# Patient Record
Sex: MALE | Race: WHITE | NOT HISPANIC OR LATINO | Employment: FULL TIME | URBAN - METROPOLITAN AREA
[De-identification: names, ages, dates, MRNs, and addresses within clinical notes are randomized per-mention and may not be internally consistent; named-entity substitution may affect disease eponyms.]

---

## 2018-06-26 ENCOUNTER — OFFICE VISIT (OUTPATIENT)
Dept: FAMILY MEDICINE | Facility: CLINIC | Age: 48
End: 2018-06-26
Payer: COMMERCIAL

## 2018-06-26 VITALS
DIASTOLIC BLOOD PRESSURE: 92 MMHG | HEIGHT: 71 IN | HEART RATE: 83 BPM | TEMPERATURE: 99 F | OXYGEN SATURATION: 96 % | BODY MASS INDEX: 29.74 KG/M2 | SYSTOLIC BLOOD PRESSURE: 134 MMHG | WEIGHT: 212.44 LBS

## 2018-06-26 DIAGNOSIS — E78.5 HYPERLIPIDEMIA, UNSPECIFIED HYPERLIPIDEMIA TYPE: ICD-10-CM

## 2018-06-26 DIAGNOSIS — G56.03 BILATERAL CARPAL TUNNEL SYNDROME: ICD-10-CM

## 2018-06-26 DIAGNOSIS — F51.01 PRIMARY INSOMNIA: ICD-10-CM

## 2018-06-26 DIAGNOSIS — R03.0 ELEVATED BP WITHOUT DIAGNOSIS OF HYPERTENSION: Primary | ICD-10-CM

## 2018-06-26 PROBLEM — D75.839 THROMBOCYTOSIS: Status: ACTIVE | Noted: 2018-01-10

## 2018-06-26 PROCEDURE — 3008F BODY MASS INDEX DOCD: CPT | Mod: CPTII,S$GLB,, | Performed by: FAMILY MEDICINE

## 2018-06-26 PROCEDURE — 99204 OFFICE O/P NEW MOD 45 MIN: CPT | Mod: S$GLB,,, | Performed by: FAMILY MEDICINE

## 2018-06-26 PROCEDURE — 99999 PR PBB SHADOW E&M-NEW PATIENT-LVL III: CPT | Mod: PBBFAC,,, | Performed by: FAMILY MEDICINE

## 2018-06-26 RX ORDER — TADALAFIL 5 MG/1
5 TABLET ORAL
COMMUNITY
End: 2018-10-17 | Stop reason: ALTCHOICE

## 2018-06-26 RX ORDER — LISINOPRIL 10 MG/1
10 TABLET ORAL DAILY
Qty: 30 TABLET | Refills: 1 | Status: SHIPPED | OUTPATIENT
Start: 2018-06-26 | End: 2018-07-27 | Stop reason: SINTOL

## 2018-06-26 RX ORDER — MULTIVITAMIN
TABLET ORAL
COMMUNITY
Start: 2018-01-09 | End: 2019-01-09

## 2018-06-26 RX ORDER — ASPIRIN 81 MG/1
81 TABLET ORAL
COMMUNITY
Start: 2018-01-09

## 2018-06-26 RX ORDER — ZOLPIDEM TARTRATE 5 MG/1
5 TABLET ORAL NIGHTLY PRN
Qty: 30 TABLET | Refills: 0 | Status: SHIPPED | OUTPATIENT
Start: 2018-06-26 | End: 2018-07-27

## 2018-06-26 NOTE — PROGRESS NOTES
Subjective:       Patient ID: Nehemias Zhang is a 47 y.o. male.    Chief Complaint: Hypertension; Insomnia; and Tingling (both hands)      HPI     Tingling    Additional comments: both hands       Last edited by Bharati Gibson MA on 6/26/2018  4:10 PM. (History)     Here to establish care and complaints of      Elevated blood pressure without diagnosis of hypertension presents for 7-8 months.  Has been managing with diet and exercise.  Worsening the past 2 months blood pressure is on the average is 150s to 160s/80s. Upon questioning, he advocates snoring but said he does not know if he has sleep apnea. He went to sleep clinic for his insomnia and sleep study done 10 years ago-cannot remember his diagnosis.  He also has HLD for which he takes Omega 3 and aspirin for primary prevention of stroke and MI.     Latest labs were done 01/18 which I reviewed with him on my chart and he will email to the clinic for our record. His AIC was 5.4, choles was 232 and TSH was normal too including PSA.    Insomnia: Chronic. Present for years.  He was on Ambien a couple months ago when he ran out on and EndoStim PCP left town. He does not take it daily and a 30 days supply would last him 6 mons.     C/O tingling to both hands. Intermittent. Present for a couple of months, lasts minutes and resolves.   Past Medical History:   Diagnosis Date    Hypertension     Insomnia      Family History   Problem Relation Age of Onset    Heart disease Mother     Prostate cancer Brother     Colon cancer Maternal Grandfather     Colon cancer Paternal Grandfather      Social History     Social History    Marital status: Single     Spouse name: N/A    Number of children: N/A    Years of education: N/A     Occupational History    Not on file.     Social History Main Topics    Smoking status: Former Smoker     Quit date: 3/26/2018    Smokeless tobacco: Not on file    Alcohol use No    Drug use: No    Sexual activity: Yes     Partners: Female  "    Other Topics Concern    Not on file     Social History Narrative    No narrative on file       Review of Systems   Constitutional: Negative for appetite change and fever.   HENT: Negative for congestion, facial swelling and voice change.    Eyes: Negative for discharge and itching.   Respiratory: Negative for cough, chest tightness and wheezing.    Cardiovascular: Negative.  Negative for chest pain and leg swelling.   Gastrointestinal: Negative for abdominal pain, nausea and vomiting.   Endocrine: Negative for cold intolerance and heat intolerance.   Genitourinary: Negative for dysuria and flank pain.   Musculoskeletal: Positive for back pain. Negative for myalgias and neck stiffness.        Low back pain-DJ for which he does eversion therapy   Skin: Negative for pallor and rash.   Neurological: Negative for facial asymmetry and weakness.   Psychiatric/Behavioral: Negative for agitation and confusion.       Objective:      BP (!) 134/92 (BP Location: Right arm, Patient Position: Sitting, BP Method: Medium (Manual))   Pulse 83   Temp 98.7 °F (37.1 °C) (Oral)   Ht 5' 11" (1.803 m)   Wt 96.3 kg (212 lb 6.6 oz)   SpO2 96%   BMI 29.63 kg/m²   No results found for this or any previous visit.  Physical Exam   Constitutional: He is oriented to person, place, and time. He appears well-developed and well-nourished.   HENT:   Head: Normocephalic and atraumatic.   Right Ear: External ear normal.   Left Ear: External ear normal.   Eyes: Conjunctivae are normal. Right eye exhibits no discharge. Left eye exhibits no discharge.   Neck: Normal range of motion. Neck supple.   Cardiovascular: Normal rate and regular rhythm.    Pulmonary/Chest: Effort normal and breath sounds normal. No respiratory distress. He has no wheezes. He exhibits no tenderness.   Abdominal: Soft. Normal appearance and bowel sounds are normal. There is no hepatosplenomegaly.   Genitourinary:   Genitourinary Comments: deferred   Musculoskeletal: " Normal range of motion.   Tinel test is positive while Phalen is negative   Lymphadenopathy:     He has no cervical adenopathy.   Neurological: He is alert and oriented to person, place, and time.   Skin: Skin is warm and dry.   Psychiatric: He has a normal mood and affect. His behavior is normal.   Nursing note and vitals reviewed.      Assessment:       1. Elevated BP without diagnosis of hypertension    2. Hyperlipidemia, unspecified hyperlipidemia type    3. Bilateral carpal tunnel syndrome    4. Primary insomnia        Plan:   New patient and we will need to obtain record from previous pcp    Elevated BP without diagnosis of hypertension:  The patient is asked to make an attempt to improve diet and exercise patterns to aid in medical management of this problem. DASH diet. Regular exercise and monitor BP at home.  - lisinopril 10 MG tablet; Take 1 tablet (10 mg total) by mouth once daily.  Dispense: 30 tablet; Refill: 1    Hyperlipidemia, unspecified hyperlipidemia type  Continue diet, exercise,omega 3  And omega    Bilateral carpal tunnel syndrome  -monitor for now    Primary insomnia  -     zolpidem (AMBIEN) 5 MG Tab; Take 1 tablet (5 mg total) by mouth nightly as needed.  Dispense: 30 tablet; Refill: 0

## 2018-06-26 NOTE — PATIENT INSTRUCTIONS
Taking ACE Inhibitors     The name of my ACE inhibitor is:        ______________________________      Your healthcare provider has prescribed an ACE (angiotensin-converting enzyme) inhibitor. This medicine opens up your blood vessels and decreases resistance. This allows your blood to flow more easily and makes your heart's work easier. This sheet gives you tips for taking your ACE inhibitor.     Take your medication at the same time each day.     Why might I need an ACE inhibitor?  · It gives you more energy to do the things you enjoy.  · It helps you stay out of the hospital.  · It helps you live longer.  · It strengthens your heart and kidneys.  Tips to help you  · Follow the fact sheet that comes with your medicine. It tells you when and how to take it. Ask for a sheet if you dont get one.  · Have a routine for taking your medicine. Take it at the same time each day. A watch with an alarm can help.  · Take your medicine at least 1 hour before you eat, if you are taking captopril or moexipril, as described in the 's instructions, or 2 hours after you eat. You may take all other ACE inhibitors at any time, according to your healthcare provider's instructions.   · Do not change the dose or stop taking your medicine, unless your provider tells you to. It may take a few weeks for you to feel that the medicine is working.     When should I call my healthcare provider?  · You have diarrhea, nausea, vomiting, or you are sweating. These can cause loss of water (dehydration) and low blood pressure.  · You have a dry, hacking cough or a sore throat.  · You feel dizzy or faint, or have a headache.  · You have a fever or chills, trouble breathing or swallowing, or swelling in your face, mouth, lips, arms, lower legs, ankles, or feet. These may be signs of an allergic reaction.  · You have any other unusual symptoms.  · Tell your healthcare provider if you wish to become pregnant or think you may be pregnant.  ACE inhibitors can cause serious side effects to your unborn child. Your healthcare provider can prescribe medicine to replace your ACE inhibitor that may be safer to take while you are pregnant or trying to become pregnant.   Date Last Reviewed: 6/1/2016  © 2183-3560 [employer]. [address] [place] [address]. All rights reserved. This information is not intended as a substitute for professional medical care. Always follow your healthcare professional's instructions.

## 2018-07-27 ENCOUNTER — OFFICE VISIT (OUTPATIENT)
Dept: FAMILY MEDICINE | Facility: CLINIC | Age: 48
End: 2018-07-27
Payer: COMMERCIAL

## 2018-07-27 VITALS
DIASTOLIC BLOOD PRESSURE: 76 MMHG | OXYGEN SATURATION: 95 % | HEART RATE: 79 BPM | SYSTOLIC BLOOD PRESSURE: 130 MMHG | HEIGHT: 70 IN | WEIGHT: 217.38 LBS | BODY MASS INDEX: 31.12 KG/M2 | TEMPERATURE: 99 F

## 2018-07-27 DIAGNOSIS — G47.33 OBSTRUCTIVE SLEEP APNEA (ADULT) (PEDIATRIC): ICD-10-CM

## 2018-07-27 DIAGNOSIS — E78.00 HYPERCHOLESTEROLEMIA: ICD-10-CM

## 2018-07-27 DIAGNOSIS — I10 HTN, GOAL BELOW 130/80: Primary | ICD-10-CM

## 2018-07-27 DIAGNOSIS — G47.00 INSOMNIA, UNSPECIFIED TYPE: ICD-10-CM

## 2018-07-27 DIAGNOSIS — E66.9 OBESITY (BMI 30-39.9): ICD-10-CM

## 2018-07-27 PROCEDURE — 99999 PR PBB SHADOW E&M-EST. PATIENT-LVL III: CPT | Mod: PBBFAC,,, | Performed by: FAMILY MEDICINE

## 2018-07-27 PROCEDURE — 3008F BODY MASS INDEX DOCD: CPT | Mod: CPTII,S$GLB,, | Performed by: FAMILY MEDICINE

## 2018-07-27 PROCEDURE — 99214 OFFICE O/P EST MOD 30 MIN: CPT | Mod: S$GLB,,, | Performed by: FAMILY MEDICINE

## 2018-07-27 RX ORDER — ZALEPLON 10 MG/1
10 CAPSULE ORAL NIGHTLY
Qty: 14 CAPSULE | Refills: 0 | Status: SHIPPED | OUTPATIENT
Start: 2018-07-27 | End: 2018-08-10

## 2018-07-27 RX ORDER — HYDROCHLOROTHIAZIDE 25 MG/1
25 TABLET ORAL DAILY
Qty: 30 TABLET | Refills: 11 | Status: SHIPPED | OUTPATIENT
Start: 2018-07-27 | End: 2018-10-17 | Stop reason: CLARIF

## 2018-07-27 NOTE — PROGRESS NOTES
Subjective:       Patient ID: Nehemias Zhang is a 48 y.o. male.    Chief Complaint: Follow-up  HTN: controlled. was started on lisinopril one month ago but stopped taking it a week ago due to dry hacking cough. Cough is almost completely resolved now. Will need another med. Denies history of gout.      Sleep Apnea: Patient presents with possible obstructive sleep apnea. Patient generally gets less than 4 hours of sleep per night, and states he generally have nightime awakenings. Snoring of moderate severity is present. Apneic episodes are present likely. Nasal obstruction are present. BMI is 31.19.    Insomnia: He has also struggled with insomnia most of his life. Diff fallig asleep and diff staying asleep. Melatonin, ambien has failed. He is not a candidate for trazodone due to history of erectile dysfunction.    EPWORTH SLEEPINESS SCALE    Sitting and reading ____  3  Watching TV ____  3  Sitting inactive in a public place ____  2  Being a passenger in a motor vehicle for an hour or more ____  0  Lying down in the afternoon  ____  2  Sitting and talking to someone  ____  2  Sitting quietly after lunch (no alcohol) ____  2  Stopped for a few minutes in traffic while driving  ____  1    Total score   ____  15    Interpretation:  0-7: Normal  sleepiness  8-9: Average amount of daytime sleepiness.  10-15:Excessively sleepiness depending on the situation. Medical attention suggested.  16-24: Excessive sleepiness and medical attention recommended.    Reference: Feliciano MCGRATH. A new method for measuring daytime sleepiness: The Fate  Sleepiness Scale. Sleep 1991; 14(6):540-5.  Past Medical History:   Diagnosis Date    Hypertension     Insomnia      Family History   Problem Relation Age of Onset    Heart disease Mother     Prostate cancer Brother     Colon cancer Maternal Grandfather     Colon cancer Paternal Grandfather      Social History     Social History    Marital status: Single     Spouse name: N/A     "Number of children: N/A    Years of education: N/A     Occupational History    Not on file.     Social History Main Topics    Smoking status: Former Smoker     Quit date: 3/26/2018    Smokeless tobacco: Not on file    Alcohol use No    Drug use: No    Sexual activity: Yes     Partners: Female     Other Topics Concern    Not on file     Social History Narrative    No narrative on file       Review of Systems   Constitutional: Negative for appetite change and fever.   HENT: Negative for congestion, facial swelling and voice change.    Eyes: Negative for discharge and itching.   Respiratory: Negative for cough, chest tightness and wheezing.    Cardiovascular: Negative.  Negative for chest pain and leg swelling.   Gastrointestinal: Negative for abdominal pain, nausea and vomiting.   Endocrine: Negative for cold intolerance and heat intolerance.   Genitourinary: Negative for dysuria and flank pain.   Musculoskeletal: Negative for myalgias and neck stiffness.   Skin: Negative for pallor and rash.   Neurological: Negative for facial asymmetry and weakness.   Psychiatric/Behavioral: Negative for agitation and confusion.       Objective:      /76 (BP Location: Right arm, Patient Position: Sitting, BP Method: Medium (Manual))   Pulse 79   Temp 99.2 °F (37.3 °C) (Tympanic)   Ht 5' 10" (1.778 m)   Wt 98.6 kg (217 lb 6 oz)   SpO2 95%   BMI 31.19 kg/m²   No results found for this or any previous visit.  Physical Exam   Constitutional: He is oriented to person, place, and time. He appears well-developed. No distress.   HENT:   Head: Normocephalic and atraumatic.   Right Ear: External ear normal.   Left Ear: External ear normal.   Eyes: Conjunctivae and EOM are normal.   Neck: Neck supple. No thyromegaly present.   Cardiovascular: Normal rate, regular rhythm and normal heart sounds.    No murmur heard.  Pulmonary/Chest: Effort normal. No respiratory distress. He has no wheezes.   Abdominal: Soft. Bowel sounds " are normal. He exhibits no distension. There is no tenderness.   Genitourinary:   Genitourinary Comments: deferred   Musculoskeletal: He exhibits no edema or deformity.   Lymphadenopathy:        Head (right side): No submandibular adenopathy present.        Head (left side): No submandibular adenopathy present.     He has no cervical adenopathy.   Neurological: He is alert and oriented to person, place, and time.   Skin: Skin is warm and dry.   Psychiatric: He has a normal mood and affect. His behavior is normal.   Nursing note and vitals reviewed.      Assessment:       1. HTN, goal below 130/80    2. Hypercholesterolemia    3. Insomnia, unspecified type    4. Obstructive sleep apnea (adult) (pediatric)    5. Obesity (BMI 30-39.9)        Plan:   HTN, goal below 130/80/Obesity:  Stop lisinopril due to side effect.  -     hydroCHLOROthiazide (HYDRODIURIL) 25 MG tablet; Take 1 tablet (25 mg total) by mouth once daily.  Dispense: 30 tablet; Refill: 11  Eat right - Choose a diet rich in fruits, vegetables, and low-fat dairy products, but low in meats, sweets, and refined grains   Be more active. If you are able, walk for 30 mins 5 times a week.    Hypercholesterolemia  Monitor with lab  Insomnia, unspecified type  -     zaleplon (SONATA) 10 MG capsule; Take 1 capsule (10 mg total) by mouth every evening. for 14 days  Dispense: 14 capsule; Refill: 0    Obstructive sleep apnea (adult) (pediatric)  -     Polysomnogram (CPAP will be added if patient meets diagnostic criteria.); Future

## 2018-07-30 NOTE — PATIENT INSTRUCTIONS
Treating Insomnia     Learning to relax before bedtime can improve your sleep.     Good sleeping habits are a key part of treatment. If needed, some medications may help you sleep better at first. Making healthy lifestyle changes and learning to relax can improve your sleep. Treating insomnia takes commitment, but trust that your efforts will pay off. Talk to your health care provider before taking any medication.  Healthy lifestyle  Your lifestyle affects your health and your sleep. Here are some healthy habits:  · Keep a regular sleep schedule. Go to bed and get up at the same time each day.  · Exercise regularly. It may help you reduce stress. Avoid strenuous exercise for 2 to 4 hours before bedtime.  · Avoid or limit naps, especially in the late afternoon.  · Use your bed only for sleep and sex.  · Dont spend too much time in bed trying to fall asleep. If you cant fall asleep, get up and do something (no electronics) until you become tired and drowsy.  · Avoid or limit caffeine and nicotine for up to 6 hours before bedtime. They can keep you awake at night.   · Also avoid alcohol for at least 4 to 6 hours before bedtime. It may help you fall asleep at first, but you will have more awakenings throughout the night, and your sleep will not be restful.  Before bedtime  To sleep better every night, try these tips:  · Have a bedtime routine to let your body and mind know when its time to sleep.  · Think of going to bed as relaxing and enjoyable. Sleep will come sooner.  · If your worries dont let you sleep, write them down in a diary. Then close it, and go to bed.  · Make sure the room is not too hot or too cold. If its not dark enough, an eye mask can help. If its noisy, try using earplugs.  Learn to relax  Stress, anxiety, and body tension may keep you awake at night. To unwind before bedtime, try a warm bath, meditation, or yoga. Also, try the following:  · Deep breathing. Sit or lie back in a chair. Take a  slow, deep breath. Hold it for 5 counts. Then breathe out slowly through your mouth. Keep doing this until you feel relaxed.  · Progressive muscle relaxation. Tense and then relax the muscles in your body as you breathe deeply. Start with your feet and work up your body to your neck and face.  Date Last Reviewed: 7/18/2015  © 3286-1463 [employer]. [address] [place] [address], [place] [id number]. All rights reserved. This information is not intended as a substitute for professional medical care. Always follow your healthcare professional's instructions.

## 2018-09-25 RX ORDER — ZOLPIDEM TARTRATE 5 MG/1
5 TABLET ORAL NIGHTLY PRN
Qty: 30 TABLET | Refills: 1 | Status: SHIPPED | OUTPATIENT
Start: 2018-09-25 | End: 2018-10-17 | Stop reason: DRUGHIGH

## 2018-10-17 ENCOUNTER — OFFICE VISIT (OUTPATIENT)
Dept: FAMILY MEDICINE | Facility: CLINIC | Age: 48
End: 2018-10-17
Payer: COMMERCIAL

## 2018-10-17 ENCOUNTER — OFFICE VISIT (OUTPATIENT)
Dept: CARDIOLOGY | Facility: CLINIC | Age: 48
End: 2018-10-17
Payer: COMMERCIAL

## 2018-10-17 ENCOUNTER — HOSPITAL ENCOUNTER (OUTPATIENT)
Dept: RADIOLOGY | Facility: HOSPITAL | Age: 48
Discharge: HOME | End: 2018-10-17
Attending: INTERNAL MEDICINE
Payer: COMMERCIAL

## 2018-10-17 ENCOUNTER — CLINICAL SUPPORT (OUTPATIENT)
Dept: CARDIOLOGY | Facility: CLINIC | Age: 48
End: 2018-10-17
Payer: COMMERCIAL

## 2018-10-17 VITALS
HEART RATE: 86 BPM | SYSTOLIC BLOOD PRESSURE: 140 MMHG | DIASTOLIC BLOOD PRESSURE: 90 MMHG | OXYGEN SATURATION: 98 % | HEIGHT: 70 IN | BODY MASS INDEX: 30.6 KG/M2 | WEIGHT: 213.75 LBS | TEMPERATURE: 98 F

## 2018-10-17 VITALS
DIASTOLIC BLOOD PRESSURE: 98 MMHG | HEART RATE: 76 BPM | HEIGHT: 70 IN | WEIGHT: 212.06 LBS | BODY MASS INDEX: 30.36 KG/M2 | SYSTOLIC BLOOD PRESSURE: 148 MMHG

## 2018-10-17 DIAGNOSIS — R07.89 OTHER CHEST PAIN: Primary | ICD-10-CM

## 2018-10-17 DIAGNOSIS — R07.89 OTHER CHEST PAIN: ICD-10-CM

## 2018-10-17 DIAGNOSIS — R42 DIZZINESS: ICD-10-CM

## 2018-10-17 DIAGNOSIS — E78.2 MIXED HYPERLIPIDEMIA: ICD-10-CM

## 2018-10-17 DIAGNOSIS — R07.89 CHEST TIGHTNESS OR PRESSURE: ICD-10-CM

## 2018-10-17 DIAGNOSIS — R07.89 CHEST TIGHTNESS OR PRESSURE: Primary | ICD-10-CM

## 2018-10-17 DIAGNOSIS — E78.49 OTHER HYPERLIPIDEMIA: ICD-10-CM

## 2018-10-17 DIAGNOSIS — I10 ESSENTIAL HYPERTENSION: ICD-10-CM

## 2018-10-17 DIAGNOSIS — I10 HTN, GOAL BELOW 130/80: ICD-10-CM

## 2018-10-17 DIAGNOSIS — G47.00 INSOMNIA, UNSPECIFIED TYPE: ICD-10-CM

## 2018-10-17 PROCEDURE — 99244 OFF/OP CNSLTJ NEW/EST MOD 40: CPT | Mod: S$GLB,,, | Performed by: INTERNAL MEDICINE

## 2018-10-17 PROCEDURE — 99215 OFFICE O/P EST HI 40 MIN: CPT | Mod: S$GLB,,, | Performed by: FAMILY MEDICINE

## 2018-10-17 PROCEDURE — 93010 ELECTROCARDIOGRAM REPORT: CPT | Mod: S$GLB,,, | Performed by: INTERNAL MEDICINE

## 2018-10-17 PROCEDURE — 71046 X-RAY EXAM CHEST 2 VIEWS: CPT | Mod: 26,,, | Performed by: RADIOLOGY

## 2018-10-17 PROCEDURE — 99999 PR PBB SHADOW E&M-EST. PATIENT-LVL III: CPT | Mod: PBBFAC,,, | Performed by: FAMILY MEDICINE

## 2018-10-17 PROCEDURE — 93005 ELECTROCARDIOGRAM TRACING: CPT | Mod: S$GLB,,, | Performed by: FAMILY MEDICINE

## 2018-10-17 PROCEDURE — 71046 X-RAY EXAM CHEST 2 VIEWS: CPT | Mod: TC,FY,PO

## 2018-10-17 PROCEDURE — 99999 PR PBB SHADOW E&M-EST. PATIENT-LVL III: CPT | Mod: PBBFAC,,, | Performed by: INTERNAL MEDICINE

## 2018-10-17 PROCEDURE — 3008F BODY MASS INDEX DOCD: CPT | Mod: CPTII,S$GLB,, | Performed by: FAMILY MEDICINE

## 2018-10-17 RX ORDER — ZOLPIDEM TARTRATE 10 MG/1
10 TABLET ORAL NIGHTLY PRN
Qty: 30 TABLET | Refills: 3 | Status: SHIPPED | OUTPATIENT
Start: 2018-10-17 | End: 2019-03-01 | Stop reason: SDUPTHER

## 2018-10-17 RX ORDER — LOSARTAN POTASSIUM AND HYDROCHLOROTHIAZIDE 12.5; 1 MG/1; MG/1
1 TABLET ORAL DAILY
Qty: 30 TABLET | Refills: 1 | Status: SHIPPED | OUTPATIENT
Start: 2018-10-17 | End: 2018-12-12

## 2018-10-17 RX ORDER — NITROGLYCERIN 0.4 MG/1
0.4 TABLET SUBLINGUAL EVERY 5 MIN PRN
Qty: 30 TABLET | Refills: 0 | Status: SHIPPED | OUTPATIENT
Start: 2018-10-17 | End: 2021-03-22

## 2018-10-17 NOTE — LETTER
October 17, 2018      Carolann Garcia MD  [address] [facility] [address]           [facility] - Cardiology  [address] [facility]  [place] [address]  Phone: [phone]  Fax: [phone]          Patient: Nehemias Zhang   MR Number: [id number]   YOB: 1970   Date of Visit: 10/17/2018       Dear Dr. Carolann Garcia:    Thank you for referring Nehemias Zhang to me for evaluation. Attached you will find relevant portions of my assessment and plan of care.    If you have questions, please do not hesitate to call me. I look forward to following Nehemias Zhang along with you.    Sincerely,    Jesse Cabral MD    Enclosure  CC:  No Recipients    If you would like to receive this communication electronically, please contact [email or web address] or [phone] to request more information on [employer] access.    For providers and/or their staff who would like to refer a patient to [facility], please contact us through our one-stop-shop provider referral line, [facility], at [phone].    If you feel you have received this communication in error or would no longer like to receive these types of communications, please e-mail [email or web address]

## 2018-10-17 NOTE — PATIENT INSTRUCTIONS
Dizziness (Vertigo) and Balance Problems: Staying Safe     Replace burned-out lightbulbs to keep your home safe and well lit.   Falls or accidents can lead to pain, broken bones, and fear of future falls. Protect yourself and others by preparing for episodes. Simple steps can help you stay safe at home and wherever you go.  Lighting  Keep all areas well lit. This helps your eyes send the right signals to the brain. It also makes you less likely to trip and fall. If bright lights make symptoms worse, dim the lights or lie in a dark room until the dizziness passes. Then turn the lights back to their normal level.  Tips:  · Keep a flashlight by the bed.  · Place nightlights in bathrooms and hallways.  · Replace burned-out bulbs, or have someone replace them for you.  Preventing falls  To reduce your risk of falling:  · Get out of bed or up from a chair slowly.  · Wear low-heeled shoes that fit properly and have slip-resistant soles.  · Remove throw rugs. Clear clutter from walkways.  · Use handrails on stairs. Have handrails installed or adjusted if needed.  · Install grab bars in the bathroom. Don't use towel racks for balance.  · Use a shower stool. Also put adhesive strips in the shower or on the tub floor.  Going out  With a little time and preparation, you can get around safely.  Tips:  · Bring a cane or walking aid if needed.  · Give yourself plenty of time in case you start to get dizzy.  · Ask your healthcare provider what type of exercise is safe for your condition.  · Be patient. If an activity such as walking through a crowded shop causes you stress, you may not be ready for it yet.  Driving  If you become dizzy or disoriented while driving, you could hurt yourself and others. That's why it's best to not drive until symptoms have gone away. In some cases, your license may be temporarily held until it's safe for you to drive again.  For safety:  · Ask a friend to drive for you.  · Take public  transportation.  · Walk to stores and other places when you can.  Asking for help  Don't be afraid to ask for help running errands, cooking meals, and doing exercise. Whether it's a friend, loved one, neighbor, or stranger on the street, a little help can make a world of difference.   Date Last Reviewed: 11/1/2016  © 6330-2695 [employer]. [address] [place] [address], [place] [address]. All rights reserved. This information is not intended as a substitute for professional medical care. Always follow your healthcare professional's instructions.

## 2018-10-17 NOTE — PROGRESS NOTES
Subjective:   Patient ID:  Nehemias Zhang is a 48 y.o. male who presents for cardiac consult of Hyperlipidemia and Chest Pain      HPI  The patient came in today for cardiac consult of Hyperlipidemia and Chest Pain    Referring Physician: Bozena Guzman MD   Reason for consult: chest pain    10/17/18  This is a 48 year old male pt with current medical conditions HTN, HLD, insomnia, ED, former smoker presents for initial CV evaluation of chest pain.     Pt has been having left sided pressure type pain. Very mild pain intensity. Stays in left side of chest into shoulder. No SOB.   Used to get palpitations. 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 Occ hearburn.   Yesterday felt dizzy, occurred twice for a minute. BP is elevated, new med has been called in not started.    ECG  Normal sinus rhythm  Possible Left atrial enlargement  Incomplete right bundle branch block    Patient feels no sob, no leg swelling, no PND, no palpitation, no syncope, no CNS symptoms.    Patient has fairly good exercise tolerance.    Patient is compliant with medications.    FH - [place] near [place]    Past Medical History:   Diagnosis Date    Hyperlipidemia     Hypertension     Insomnia        Past Surgical History:   Procedure Laterality Date    TONSILLECTOMY         Social History     Tobacco Use    Smoking status: Former Smoker     Last attempt to quit: 3/26/2018     Years since quittin.5    Smokeless tobacco: Never Used   Substance Use Topics    Alcohol use: No    Drug use: No       Family History   Problem Relation Age of Onset    Heart disease Mother     Prostate cancer Brother     Colon cancer Maternal Grandfather     Colon cancer Paternal Grandfather           Medication List           Accurate as of 10/17/18  1:28 PM. If you have any questions, ask your nurse or doctor.               START taking these medications    losartan-hydrochlorothiazide 100-12.5 mg 100-12.5 mg Tab  Commonly  known as:  HYZAAR  Take 1 tablet by mouth once daily.  Started by:  Bozena Guzman MD     nitroGLYCERIN 0.4 MG SL tablet  Commonly known as:  NITROSTAT  Place 1 tablet (0.4 mg total) under the tongue every 5 (five) minutes as needed.  Started by:  Jesse Cabral Md, MD        CHANGE how you take these medications    zolpidem 10 mg Tab  Commonly known as:  AMBIEN  Take 1 tablet (10 mg total) by mouth nightly as needed.  What changed:    · medication strength  · how much to take  Changed by:  Bozena Guzman MD        CONTINUE taking these medications    aspirin 81 MG EC tablet  Commonly known as:  ECOTRIN     multivitamin per tablet  Commonly known as:  THERAGRAN     omega 3-dha-epa-fish oil 900-1,400 mg Cpdr        STOP taking these medications    hydroCHLOROthiazide 25 MG tablet  Commonly known as:  HYDRODIURIL  Stopped by:  Bozena Guzman MD     tadalafil 5 MG tablet  Commonly known as:  CIALIS  Stopped by:  Bozena Guzman MD           Where to Get Your Medications      These medications were sent to [facility]/pharmacy #[id number] - [place], [place] - [address] [address]    Phone:  [phone]   · losartan-hydrochlorothiazide 100-12.5 mg 100-12.5 mg Tab  · nitroGLYCERIN 0.4 MG SL tablet  · zolpidem 10 mg Tab         Review of Systems   Constitutional: Negative.    HENT: Negative.    Eyes: Negative.    Respiratory: Negative.    Cardiovascular: Positive for chest pain.   Gastrointestinal: Negative.    Genitourinary: Negative.    Musculoskeletal: Negative.    Skin: Negative.    Neurological: Positive for dizziness.   Endo/Heme/Allergies: Negative.    Psychiatric/Behavioral: Negative.    All 12 systems otherwise negative.      Wt Readings from Last 3 Encounters:   10/17/18 96.2 kg (212 lb 1.3 oz)   10/17/18 97 kg (213 lb 11.8 oz)   07/27/18 98.6 kg (217 lb 6 oz)     Temp Readings from Last 3 Encounters:   10/17/18 98.3 °F (36.8 °C) (Oral)  "  07/27/18 99.2 °F (37.3 °C) (Tympanic)   06/26/18 98.7 °F (37.1 °C) (Oral)     BP Readings from Last 3 Encounters:   10/17/18 (!) 148/98   10/17/18 (!) 140/90   07/27/18 130/76     Pulse Readings from Last 3 Encounters:   10/17/18 76   10/17/18 86   07/27/18 79       BP (!) 148/98 (BP Location: Right arm, Patient Position: Sitting, BP Method: Medium (Manual))   Pulse 76   Ht 5' 10" (1.778 m)   Wt 96.2 kg (212 lb 1.3 oz)   BMI 30.43 kg/m²     Objective:   Physical Exam   Constitutional: He is oriented to person, place, and time. He appears well-developed and well-nourished. No distress.   HENT:   Head: Normocephalic and atraumatic.   Nose: Nose normal.   Mouth/Throat: Oropharynx is clear and moist.   Eyes: Conjunctivae and EOM are normal. No scleral icterus.   Neck: Normal range of motion. Neck supple. No JVD present. No thyromegaly present.   Cardiovascular: Normal rate, regular rhythm, S1 normal and S2 normal. Exam reveals no gallop, no S3, no S4 and no friction rub.   No murmur heard.  Pulmonary/Chest: Effort normal and breath sounds normal. No stridor. No respiratory distress. He has no wheezes. He has no rales. He exhibits no tenderness.   Abdominal: Soft. Bowel sounds are normal. He exhibits no distension and no mass. There is no tenderness. There is no rebound.   Genitourinary:   Genitourinary Comments: Deferred   Musculoskeletal: Normal range of motion. He exhibits no edema, tenderness or deformity.   Lymphadenopathy:     He has no cervical adenopathy.   Neurological: He is alert and oriented to person, place, and time. He exhibits normal muscle tone. Coordination normal.   Skin: Skin is warm and dry. No rash noted. He is not diaphoretic. No erythema. No pallor.   Psychiatric: He has a normal mood and affect. His behavior is normal. Judgment and thought content normal.   Nursing note and vitals reviewed.      No results found for: NA, K, CL, CO2, BUN, CREATININE, GLU, HGBA1C, MG, AST, ALT, ALBUMIN, PROT, " BILITOT, WBC, HGB, HCT, MCV, PLT, INR, TSH, CHOL, HDL, LDLCALC, TRIG, BNP  Assessment:      1. Other chest pain    2. Other hyperlipidemia    3. Essential hypertension        Plan:   1. Chest pain  - exercise stress test  - 2D ECHO  - CXR  - discussed non cardiac etiologies may need GI workup    2. HLD  - f/u labs  - low doug diet    3. HTN  - BP elevated  - new meds started today  - low salt diet, weight loss  Thank you for allowing me to participate in this patient's care. Please do not hesitate to contact me with any questions or concerns. Consult note has been forwarded to the referral physician.

## 2018-10-17 NOTE — PROGRESS NOTES
Subjective:       Patient ID: Nehemias Zhang is a 48 y.o. male.    Chief Complaint: Dizziness and uncontrolled HTN, Insomnia and HLD.  Dizziness: x 2 episodes in the past week. First incident ,, he was up and walking to the kitchen. Transient. Feeling of lightheadedness and resolved within seconds after he sat down.  Last night he was washing plates when it happened. Denies any triggers. Upon questioning, he advocates chest tightness to the left side of the chest while sleeping.  Chest tightness: Occurred last night. Mild. Retrosternal.  Denies radiation, non progressive. Denies any other sxs. He is on Omega 3 for HLD and ASA for prevention.   Risk factors: male, age, obesity, HLD, HTN, light smoker-quit last month.   HTN: seen a month ago for persistent cough for which Lisinopril was discontinued and HCTZ initiated. Cough is resolved but the blood pressure is not being controlled on the HCTZ        Past Medical History:   Diagnosis Date    Hypertension     Insomnia      Family History   Problem Relation Age of Onset    Heart disease Mother     Prostate cancer Brother     Colon cancer Maternal Grandfather     Colon cancer Paternal Grandfather      Social History     Socioeconomic History    Marital status: Single     Spouse name: Not on file    Number of children: Not on file    Years of education: Not on file    Highest education level: Not on file   Social Needs    Financial resource strain: Not on file    Food insecurity - worry: Not on file    Food insecurity - inability: Not on file    Transportation needs - medical: Not on file    Transportation needs - non-medical: Not on file   Occupational History    Not on file   Tobacco Use    Smoking status: Former Smoker     Last attempt to quit: 3/26/2018     Years since quittin.5   Substance and Sexual Activity    Alcohol use: No    Drug use: No    Sexual activity: Yes     Partners: Female   Other Topics Concern    Not on file   Social  "History Narrative    Not on file     Outpatient Encounter Medications as of 10/17/2018   Medication Sig Dispense Refill    aspirin (ECOTRIN) 81 MG EC tablet Take 81 mg by mouth.      multivitamin (THERAGRAN) per tablet Take by mouth.      omega 3-dha-epa-fish oil 900-1,400 mg CpDR Take by mouth.      [DISCONTINUED] hydroCHLOROthiazide (HYDRODIURIL) 25 MG tablet Take 1 tablet (25 mg total) by mouth once daily. 30 tablet 11    [DISCONTINUED] tadalafil (CIALIS) 5 MG tablet Take 5 mg by mouth as needed for Erectile Dysfunction.      [DISCONTINUED] zolpidem (AMBIEN) 5 MG Tab Take 1 tablet (5 mg total) by mouth nightly as needed. 30 tablet 1    losartan-hydrochlorothiazide 100-12.5 mg (HYZAAR) 100-12.5 mg Tab Take 1 tablet by mouth once daily. 30 tablet 1    zolpidem (AMBIEN) 10 mg Tab Take 1 tablet (10 mg total) by mouth nightly as needed. 30 tablet 3     No facility-administered encounter medications on file as of 10/17/2018.        Review of Systems   Constitutional: Negative for appetite change and fever.   HENT: Negative for congestion, facial swelling and voice change.    Eyes: Negative for discharge and itching.   Respiratory: Negative for cough, chest tightness and wheezing.    Cardiovascular: Negative.  Negative for chest pain and leg swelling.   Gastrointestinal: Negative for abdominal pain, nausea and vomiting.   Endocrine: Negative for cold intolerance and heat intolerance.   Genitourinary: Negative for dysuria and flank pain.   Musculoskeletal: Negative for myalgias and neck stiffness.   Skin: Negative for pallor and rash.   Neurological: Negative for facial asymmetry and weakness.   Psychiatric/Behavioral: Negative for agitation and confusion.       Objective:      BP (!) 140/90 (BP Location: Right arm, Patient Position: Sitting, BP Method: Medium (Manual))   Pulse 86   Temp 98.3 °F (36.8 °C) (Oral)   Ht 5' 10" (1.778 m)   Wt 97 kg (213 lb 11.8 oz)   SpO2 98%   BMI 30.67 kg/m²   Physical Exam "   Constitutional: He is oriented to person, place, and time. He appears well-developed. No distress.   HENT:   Head: Normocephalic and atraumatic.   Right Ear: External ear normal.   Left Ear: External ear normal.   Eyes: Conjunctivae and EOM are normal.   Neck: Neck supple.   Cardiovascular: Normal rate, regular rhythm, normal heart sounds and intact distal pulses.   No murmur heard.  Pulmonary/Chest: Effort normal and breath sounds normal. No respiratory distress. He has no wheezes.   Abdominal: Soft. Normal appearance and bowel sounds are normal. There is no hepatosplenomegaly.   Genitourinary:   Genitourinary Comments: deferred   Musculoskeletal: Normal range of motion. He exhibits no edema.   Neurological: He is alert and oriented to person, place, and time.   Skin: Skin is warm and dry.   Psychiatric: He has a normal mood and affect. His behavior is normal.   Nursing note and vitals reviewed.      No results found for this or any previous visit.  Assessment:       1. Chest tightness or pressure    2. Dizziness    3. HTN, goal below 130/80    4. Insomnia, unspecified type    5. Mixed hyperlipidemia        Plan:   Chest tightness or pressure: new problem of unknown diagnosis. Not in any distress.   HUEY/HEART score -no troponin for calculation.  -     CBC auto differential; Future; Expected date: 10/17/2018  -     Comprehensive metabolic panel; Future; Expected date: 10/17/2018  -     Lipid panel; Future; Expected date: 10/17/2018  -     EKG 12-lead; Future; Expected date: 10/17/2018  -     Ambulatory referral to Cardiology-new onset sxs, abnormal ecg, appt today with cards.  -     TSH; Future; Expected date: 10/17/2018  EKG: there are no previous tracings available for comparison, No ST changes, Incomplete RBBB.     Dizziness: New sxs. Transient. Neuro exam was benign, Rule out cardiac etiology and dehydartion.  Stay hydrated.  -     Orthostatic blood pressure-I reviewed the VS and it was normal    HTN, goal  below 130/80, uncontrolled  Stop HCTZ, will combine with losartan at a lower dose for easy of taking  -     CBC auto differential; Future; Expected date: 10/17/2018  -     losartan-hydrochlorothiazide 100-12.5 mg (HYZAAR) 100-12.5 mg Tab; Take 1 tablet by mouth once daily.  Dispense: 30 tablet; Refill: 1  -     TSH; Future; Expected date: 10/17/2018    Insomnia, unspecified type  -     zolpidem (AMBIEN) 10 mg Tab; Take 1 tablet (10 mg total) by mouth nightly as needed.  Dispense: 30 tablet; Refill: 3  HLD:   Per history, check lipids  Continue fish oil    Total time spent in face to face counseling and coordination of care - 40 minutes over 50% of time was used in discussion of prognosis, risks, benefits of treatment, instructions and compliance with regimen . Discussion with other physicians or health care providers- cardiology clinic to get him an earlier appt

## 2018-10-19 ENCOUNTER — CLINICAL SUPPORT (OUTPATIENT)
Dept: CARDIOLOGY | Facility: CLINIC | Age: 48
End: 2018-10-19
Attending: INTERNAL MEDICINE
Payer: COMMERCIAL

## 2018-10-19 DIAGNOSIS — R07.89 OTHER CHEST PAIN: ICD-10-CM

## 2018-10-19 DIAGNOSIS — E78.49 OTHER HYPERLIPIDEMIA: ICD-10-CM

## 2018-10-19 LAB — DIASTOLIC DYSFUNCTION: NO

## 2018-10-19 PROCEDURE — 93015 CV STRESS TEST SUPVJ I&R: CPT | Mod: S$GLB,,, | Performed by: INTERNAL MEDICINE

## 2018-10-23 NOTE — PROGRESS NOTES
Subjective:       Patient ID: Nehemias Zhang is a 48 y.o. male.    Chief Complaint: No chief complaint on file.        Past Medical History:   Diagnosis Date    Hyperlipidemia     Hypertension     Insomnia      Family History   Problem Relation Age of Onset    Heart disease Mother     Prostate cancer Brother     Colon cancer Maternal Grandfather     Colon cancer Paternal Grandfather      Social History     Socioeconomic History    Marital status: Single     Spouse name: Not on file    Number of children: Not on file    Years of education: Not on file    Highest education level: Not on file   Social Needs    Financial resource strain: Not on file    Food insecurity - worry: Not on file    Food insecurity - inability: Not on file    Transportation needs - medical: Not on file    Transportation needs - non-medical: Not on file   Occupational History    Not on file   Tobacco Use    Smoking status: Former Smoker     Last attempt to quit: 3/26/2018     Years since quittin.5    Smokeless tobacco: Never Used   Substance and Sexual Activity    Alcohol use: No    Drug use: No    Sexual activity: Yes     Partners: Female   Other Topics Concern    Not on file   Social History Narrative    Not on file     Outpatient Encounter Medications as of 10/17/2018   Medication Sig Dispense Refill    aspirin (ECOTRIN) 81 MG EC tablet Take 81 mg by mouth.      losartan-hydrochlorothiazide 100-12.5 mg (HYZAAR) 100-12.5 mg Tab Take 1 tablet by mouth once daily. 30 tablet 1    multivitamin (THERAGRAN) per tablet Take by mouth.      omega 3-dha-epa-fish oil 900-1,400 mg CpDR Take by mouth.      zolpidem (AMBIEN) 10 mg Tab Take 1 tablet (10 mg total) by mouth nightly as needed. 30 tablet 3    [DISCONTINUED] hydroCHLOROthiazide (HYDRODIURIL) 25 MG tablet Take 1 tablet (25 mg total) by mouth once daily. 30 tablet 11    [DISCONTINUED] tadalafil (CIALIS) 5 MG tablet Take 5 mg by mouth as needed for Erectile  Dysfunction.      [DISCONTINUED] zolpidem (AMBIEN) 5 MG Tab Take 1 tablet (5 mg total) by mouth nightly as needed. 30 tablet 1     No facility-administered encounter medications on file as of 10/17/2018.        Review of Systems    Objective:      There were no vitals taken for this visit.  Physical Exam    No results found for this or any previous visit.  Assessment:       1. Chest tightness or pressure        Plan:   Chest tightness or pressure  -     EKG 12-lead

## 2018-10-24 ENCOUNTER — CLINICAL SUPPORT (OUTPATIENT)
Dept: CARDIOLOGY | Facility: CLINIC | Age: 48
End: 2018-10-24
Attending: INTERNAL MEDICINE
Payer: COMMERCIAL

## 2018-10-24 DIAGNOSIS — E78.49 OTHER HYPERLIPIDEMIA: ICD-10-CM

## 2018-10-24 DIAGNOSIS — R07.89 OTHER CHEST PAIN: ICD-10-CM

## 2018-10-24 LAB
DIASTOLIC DYSFUNCTION: NO
ESTIMATED PA SYSTOLIC PRESSURE: 21.69
RETIRED EF AND QEF - SEE NOTES: 60 (ref 55–65)
TRICUSPID VALVE REGURGITATION: NORMAL

## 2018-10-24 PROCEDURE — 93306 TTE W/DOPPLER COMPLETE: CPT | Mod: S$GLB,,, | Performed by: INTERNAL MEDICINE

## 2018-11-15 ENCOUNTER — OFFICE VISIT (OUTPATIENT)
Dept: CARDIOLOGY | Facility: CLINIC | Age: 48
End: 2018-11-15
Payer: COMMERCIAL

## 2018-11-15 VITALS
HEART RATE: 74 BPM | DIASTOLIC BLOOD PRESSURE: 82 MMHG | BODY MASS INDEX: 29.35 KG/M2 | WEIGHT: 205 LBS | SYSTOLIC BLOOD PRESSURE: 126 MMHG | HEIGHT: 70 IN

## 2018-11-15 DIAGNOSIS — R07.89 OTHER CHEST PAIN: Primary | ICD-10-CM

## 2018-11-15 DIAGNOSIS — E78.2 MIXED HYPERLIPIDEMIA: ICD-10-CM

## 2018-11-15 DIAGNOSIS — I10 ESSENTIAL HYPERTENSION: ICD-10-CM

## 2018-11-15 DIAGNOSIS — E78.49 OTHER HYPERLIPIDEMIA: ICD-10-CM

## 2018-11-15 PROCEDURE — 3008F BODY MASS INDEX DOCD: CPT | Mod: CPTII,S$GLB,, | Performed by: INTERNAL MEDICINE

## 2018-11-15 PROCEDURE — 99214 OFFICE O/P EST MOD 30 MIN: CPT | Mod: S$GLB,,, | Performed by: INTERNAL MEDICINE

## 2018-11-15 PROCEDURE — 99999 PR PBB SHADOW E&M-EST. PATIENT-LVL III: CPT | Mod: PBBFAC,,, | Performed by: INTERNAL MEDICINE

## 2018-11-15 NOTE — PROGRESS NOTES
Subjective:   Patient ID:  Nehemias Zhang is a 48 y.o. male who presents for cardiac consult of Hypertension (4 wk f/u results)      Hypertension   Associated symptoms include chest pain.     The patient came in today for cardiac consult of Hypertension (4 wk f/u results)    Referring Physician: Bozena Guzman MD   Reason for consult: chest pain    This is a 48 year old male pt with current medical conditions HTN, HLD, insomnia, ED, former smoker presents for follow up CV evaluation.    10/17/18  Pt has been having left sided pressure type pain. Very mild pain intensity. Stays in left side of chest into shoulder. No SOB.   Used to get palpitations. 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 Occ hearburn.   Yesterday felt dizzy, occurred twice for a minute. BP is elevated, new med has been called in not started.    11/15/18  Pt had negative exercise stress test and normal 2D ECHO. BP has improved. Pt had an episode of chest pressure last week and had some arm pain last week thinks it from shoulder. Occ out of breath, trying to do 8000 steps/day. Has been losing weight recently.     Patient feels no sob, no leg swelling, no PND, no palpitation, no syncope, no CNS symptoms.    Patient has fairly good exercise tolerance.    Patient is compliant with medications.    FH - [place]    10/19/18 ETT  EKG Conclusions:    1. The EKG portion of this study is negative for ischemia at a high workload, and peak heart rate of 157 bpm (91% of predicted).   2. Exercise capacity is average.   3. Blood pressure response to exercise was normal (Presenting BP: 120/80 Peak BP: 172/70).   4. No significant arrhythmias were present.   5. There were no symptoms of chest discomfort or significant dyspnea throughout the protocol.   6. The Duke treadmill score was 9 suggesting a low probability for future cardiovascular events.    This document has been electronically    SIGNED BY: Jesse Cabral MD  On: 10/19/2018 16:52    2d ECHO  CONCLUSIONS     1 - Concentric hypertrophy.     2 - No wall motion abnormalities.     3 - Normal left ventricular systolic function (EF 60-65%).     4 - Normal left ventricular diastolic function.     5 - Normal right ventricular systolic function .     6 - The estimated PA systolic pressure is 22 mmHg.     7 - Trivial tricuspid regurgitation.     8 - Intermediate central venous pressure.       This document has been electronically    SIGNED BY: Jewels Walker MD On: 10/24/2018 12:53    Past Medical History:   Diagnosis Date    Hyperlipidemia     Hypertension     Insomnia        Past Surgical History:   Procedure Laterality Date    TONSILLECTOMY         Social History     Tobacco Use    Smoking status: Former Smoker     Last attempt to quit: 3/26/2018     Years since quittin.6    Smokeless tobacco: Never Used   Substance Use Topics    Alcohol use: No    Drug use: No       Family History   Problem Relation Age of Onset    Heart disease Mother     Prostate cancer Brother     Colon cancer Maternal Grandfather     Colon cancer Paternal Grandfather           Medication List           Accurate as of 11/15/18  9:20 AM. If you have any questions, ask your nurse or doctor.               CONTINUE taking these medications    aspirin 81 MG EC tablet  Commonly known as:  ECOTRIN     losartan-hydrochlorothiazide 100-12.5 mg 100-12.5 mg Tab  Commonly known as:  HYZAAR  Take 1 tablet by mouth once daily.     multivitamin per tablet  Commonly known as:  THERAGRAN     nitroGLYCERIN 0.4 MG SL tablet  Commonly known as:  NITROSTAT  Place 1 tablet (0.4 mg total) under the tongue every 5 (five) minutes as needed.     omega 3-dha-epa-fish oil 900-1,400 mg Cpdr     zolpidem 10 mg Tab  Commonly known as:  AMBIEN  Take 1 tablet (10 mg total) by mouth nightly as needed.            Review of Systems   Constitutional: Negative.    HENT: Negative.    Eyes: Negative.    Respiratory: Negative.   "  Cardiovascular: Positive for chest pain.   Gastrointestinal: Negative.    Genitourinary: Negative.    Musculoskeletal: Negative.    Skin: Negative.    Neurological: Negative.    Endo/Heme/Allergies: Negative.    Psychiatric/Behavioral: Negative.    All 12 systems otherwise negative.      Wt Readings from Last 3 Encounters:   11/15/18 93 kg (205 lb 0.4 oz)   10/17/18 96.2 kg (212 lb 1.3 oz)   10/17/18 97 kg (213 lb 11.8 oz)     Temp Readings from Last 3 Encounters:   10/17/18 98.3 °F (36.8 °C) (Oral)   07/27/18 99.2 °F (37.3 °C) (Tympanic)   06/26/18 98.7 °F (37.1 °C) (Oral)     BP Readings from Last 3 Encounters:   11/15/18 126/82   10/17/18 (!) 148/98   10/17/18 (!) 140/90     Pulse Readings from Last 3 Encounters:   11/15/18 74   10/17/18 76   10/17/18 86       /82 (Patient Position: Sitting, BP Method: Small (Manual))   Pulse 74   Ht 5' 10" (1.778 m)   Wt 93 kg (205 lb 0.4 oz)   BMI 29.42 kg/m²     Objective:   Physical Exam   Constitutional: He is oriented to person, place, and time. He appears well-developed and well-nourished. No distress.   HENT:   Head: Normocephalic and atraumatic.   Nose: Nose normal.   Mouth/Throat: Oropharynx is clear and moist.   Eyes: Conjunctivae and EOM are normal. No scleral icterus.   Neck: Normal range of motion. Neck supple. No JVD present. No thyromegaly present.   Cardiovascular: Normal rate, regular rhythm, S1 normal and S2 normal. Exam reveals no gallop, no S3, no S4 and no friction rub.   No murmur heard.  Pulmonary/Chest: Effort normal and breath sounds normal. No stridor. No respiratory distress. He has no wheezes. He has no rales. He exhibits no tenderness.   Abdominal: Soft. Bowel sounds are normal. He exhibits no distension and no mass. There is no tenderness. There is no rebound.   Genitourinary:   Genitourinary Comments: Deferred   Musculoskeletal: Normal range of motion. He exhibits no edema, tenderness or deformity.   Lymphadenopathy:     He has no " cervical adenopathy.   Neurological: He is alert and oriented to person, place, and time. He exhibits normal muscle tone. Coordination normal.   Skin: Skin is warm and dry. No rash noted. He is not diaphoretic. No erythema. No pallor.   Psychiatric: He has a normal mood and affect. His behavior is normal. Judgment and thought content normal.   Nursing note and vitals reviewed.      Lab Results   Component Value Date     10/17/2018    K 4.0 10/17/2018     10/17/2018    CO2 29 10/17/2018    BUN 9 10/17/2018    CREATININE 0.8 10/17/2018    GLU 89 10/17/2018    AST 19 10/17/2018    ALT 30 10/17/2018    ALBUMIN 4.2 10/17/2018    PROT 7.6 10/17/2018    BILITOT 0.5 10/17/2018    WBC 8.53 10/17/2018    HGB 16.4 10/17/2018    HCT 47.7 10/17/2018    MCV 93 10/17/2018     (H) 10/17/2018    TSH 1.564 10/17/2018    CHOL 218 (H) 10/17/2018    HDL 41 10/17/2018    LDLCALC 112.0 10/17/2018    TRIG 325 (H) 10/17/2018     Assessment:      1. Other chest pain    2. Essential hypertension    3. Other hyperlipidemia    4. Mixed hyperlipidemia        Plan:   1. Chest pain  - exercise stress test - negative  - 2D ECHO - normal  - discussed non cardiac etiologies may need GI workup    2. HLD  - low doug diet   - repeat lipids next year     3. HTN  - well controlled   - low salt diet, weight loss    Thank you for allowing me to participate in this patient's care. Please do not hesitate to contact me with any questions or concerns. Consult note has been forwarded to the referral physician.

## 2018-11-15 NOTE — PATIENT INSTRUCTIONS
Established High Blood Pressure    High blood pressure (hypertension) is a chronic disease. Often, healthcare providers dont know what causes it. But it can be caused by certain health conditions and medicines.  If you have high blood pressure, you may not have any symptoms. If you do have symptoms, they may include headache, dizziness, changes in your vision, chest pain, and shortness of breath. But even without symptoms, high blood pressure thats not treated raises your risk for heart attack and stroke. High blood pressure is a serious health risk and shouldnt be ignored.  A blood pressure reading is made up of two numbers: a higher number over a lower number. The top number is the systolic pressure. The bottom number is the diastolic pressure. A normal blood pressure is a systolic pressure of  less than 120 over a diastolic pressure of less than 80. You will see your blood pressure readings written together. For example, a person with a systolic pressure of 188 and a diastolic pressure of 78 will have 118/78 written in the medical record.  High blood pressure is when either the top number is 140 or higher, or the bottom number is 90 or higher. This must be the result when taking your blood pressure a number of times. The blood pressures between normal and high are called prehypertension.  Home care  If you have high blood pressure, you should do what is listed below to lower your blood pressure. If you are taking medicines for high blood pressure, these methods may reduce or end your need for medicines in the future.  · Begin a weight-loss program if you are overweight.  · Cut back on how much salt you get in your diet. Heres how to do this:  ¨ Dont eat foods that have a lot of salt. These include olives, pickles, smoked meats, and salted potato chips.  ¨ Dont add salt to your food at the table.  ¨ Use only small amounts of salt when cooking.  · Start an exercise program. Talk with your healthcare  provider about the type of exercise program that would be best for you. It doesn't have to be hard. Even brisk walking for 20 minutes 3 times a week is a good form of exercise.  · Dont take medicines that stimulate the heart. This includes many over-the-counter cold and sinus decongestant pills and sprays, as well as diet pills. Check the warnings about hypertension on the label. Before buying any over-the-counter medicines or supplements, always ask the pharmacist about the product's potential interaction with your high blood pressure and your high blood pressure medicines.  · Stimulants such as amphetamine or cocaine could be deadly for someone with high blood pressure. Never take these.  · Limit how much caffeine you get in your diet. Switch to caffeine-free products.  · Stop smoking. If you are a long-time smoker, this can be hard. Talk to your healthcare provider about medicines and nicotine replacement options to help you. Also, enroll in a stop-smoking program to make it more likely that you will quit for good.  · Learn how to handle stress. This is an important part of any program to lower blood pressure. Learn about relaxation methods like meditation, yoga, or biofeedback.  · If your provider prescribed medicines, take them exactly as directed. Missing doses may cause your blood pressure get out of control.  · If you miss a dose or doses, check with your healthcare provider or pharmacist about what to do.  · Consider buying an automatic blood pressure machine. Ask your provider for a recommendation. You can get one of these at most pharmacies.     The American Heart Association recommends the following guidelines for home blood pressure monitoring:  · Don't smoke or drink coffee for 30 minutes before taking your blood pressure.  · Go to the bathroom before the test.  · Relax for 5 minutes before taking the measurement.  · Sit with your back supported (don't sit on a couch or soft chair); keep your feet on  the floor uncrossed. Place your arm on a solid flat surface (like a table) with the upper part of the arm at heart level. Place the middle of the cuff directly above the eye of the elbow. Check the monitor's instruction manual for an illustration.  · Take multiple readings. When you measure, take 2 to 3 readings one minute apart and record all of the results.  · Take your blood pressure at the same time every day, or as your healthcare provider recommends.  · Record the date, time, and blood pressure reading.  · Take the record with you to your next medical appointment. If your blood pressure monitor has a built-in memory, simply take the monitor with you to your next appointment.  · Call your provider if you have several high readings. Don't be frightened by a single high blood pressure reading, but if you get several high readings, check in with your healthcare provider.  · Note: When blood pressure reaches a systolic (top number) of 180 or higher OR diastolic (bottom number) of 110 or higher, seek emergency medical treatment.  Follow-up care  You will need to see your healthcare provider regularly. This is to check your blood pressure and to make changes to your medicines. Make a follow-up appointment as directed. Bring the record of your home blood pressure readings to the appointment.  When to seek medical advice  Call your healthcare provider right away if any of these occur:  · Blood pressure reaches a systolic (upper number) of 180 or higher OR a diastolic (bottom number) of 110 or higher  · Chest pain or shortness of breath  · Severe headache  · Throbbing or rushing sound in the ears  · Nosebleed  · Sudden severe pain in your belly (abdomen)  · Extreme drowsiness, confusion, or fainting  · Dizziness or spinning sensation (vertigo)  · Weakness of an arm or leg or one side of the face  · You have problems speaking or seeing   Date Last Reviewed: 12/1/2016  © 3684-6760 [employer]. [address] [place] [address]  [place] [address]. All rights reserved. This information is not intended as a substitute for professional medical care. Always follow your healthcare professional's instructions.          Low-Salt Diet  This diet removes foods that are high in salt. It also limits the amount of salt you use when cooking. It is most often used for people with high blood pressure, edema (fluid retention), and kidney, liver, or heart disease.  Table salt contains the mineral sodium. Your body needs sodium to work normally. But too much sodium can make your health problems worse. Your healthcare provider is recommending a low-salt (also called low-sodium) diet for you. Your total daily allowance of salt is 1,500 to 2,300 milligrams (mg). It is less than 1 teaspoon of table salt. This means you can have only about 500 to 700 mg of sodium at each meal. People with certain health problems should limit salt intake to the lower end of the recommended range.    When you cook, dont add much salt. If you can cook without using salt, even better. Dont add salt to your food at the table.  When shopping, read food labels. Salt is often called sodium on the label. Choose foods that are salt-free, low salt, or very low salt. Note that foods with reduced salt may not lower your salt intake enough.    Beans, potatoes, and pasta  Ok: Dry beans, split peas, lentils, potatoes, rice, macaroni, pasta, spaghetti without added salt  Avoid: Potato chips, tortilla chips, and similar products  Breads and cereals  Ok: Low-sodium breads, rolls, cereals, and cakes; low-salt crackers, matzo crackers  Avoid: Salted crackers, pretzels, popcorn, Maltese toast, pancakes, muffins  Dairy  Ok: Milk, chocolate milk, hot chocolate mix, low-salt cheeses, and yogurt  Avoid: Processed cheese and cheese spreads; Roquefort, Camembert, and cottage cheese; buttermilk, instant breakfast drink  Desserts  Ok: Ice cream, frozen yogurt, juice bars, gelatin, cookies and  pies, sugar, honey, jelly, hard candy  Avoid: Most pies, cakes and cookies prepared or processed with salt; instant pudding  Drinks  Ok: Tea, coffee, fizzy (carbonated) drinks, juices  Avoid: Flavored coffees, electrolyte replacement drinks, sports drinks  Meats  Ok: All fresh meat, fish, poultry, low-salt tuna, eggs, egg substitute  Avoid: Smoked, pickled, brine-cured, or salted meats and fish. This includes winslow, chipped beef, corned beef, hot dogs, deli meats, ham, kosher meats, salt pork, sausage, canned tuna, salted codfish, smoked salmon, herring, sardines, or anchovies.  Seasonings and spices  Ok: Most seasonings are okay. Good substitutes for salt include: fresh herb blends, hot sauce, lemon, garlic, curtis, vinegar, dry mustard, parsley, cilantro, horseradish, tomato paste, regular margarine, mayonnaise, unsalted butter, cream cheese, vegetable oil, cream, low-salt salad dressing and gravy.  Avoid: Regular ketchup, relishes, pickles, soy sauce, teriyaki sauce, Worcestershire sauce, BBQ sauce, tartar sauce, meat tenderizer, chili sauce, regular gravy, regular salad dressing, salted butter  Soups  Ok: Low-salt soups and broths made with allowed foods  Avoid: Bouillon cubes, soups with smoked or salted meats, regular soup and broth  Vegetables  Ok: Most vegetables are okay; also low-salt tomato and vegetable juices  Avoid: Sauerkraut and other brine-soaked vegetables; pickles and other pickled vegetables; tomato juice, olives  Date Last Reviewed: 8/1/2016  © 2162-9197 [employer]. [address] [place] [address], [place] [address]. All rights reserved. This information is not intended as a substitute for professional medical care. Always follow your healthcare professional's instructions.

## 2018-12-10 DIAGNOSIS — I10 HTN, GOAL BELOW 130/80: ICD-10-CM

## 2018-12-10 RX ORDER — LOSARTAN POTASSIUM AND HYDROCHLOROTHIAZIDE 12.5; 1 MG/1; MG/1
TABLET ORAL
Qty: 30 TABLET | Refills: 1 | OUTPATIENT
Start: 2018-12-10

## 2018-12-12 ENCOUNTER — OFFICE VISIT (OUTPATIENT)
Dept: FAMILY MEDICINE | Facility: CLINIC | Age: 48
End: 2018-12-12
Payer: COMMERCIAL

## 2018-12-12 VITALS
WEIGHT: 206.81 LBS | HEIGHT: 70 IN | BODY MASS INDEX: 29.61 KG/M2 | OXYGEN SATURATION: 98 % | SYSTOLIC BLOOD PRESSURE: 128 MMHG | TEMPERATURE: 99 F | HEART RATE: 83 BPM | DIASTOLIC BLOOD PRESSURE: 84 MMHG

## 2018-12-12 DIAGNOSIS — G47.00 INSOMNIA, UNSPECIFIED TYPE: ICD-10-CM

## 2018-12-12 DIAGNOSIS — I10 HTN, GOAL BELOW 130/80: Primary | ICD-10-CM

## 2018-12-12 DIAGNOSIS — N45.1 LEFT EPIDIDYMITIS: ICD-10-CM

## 2018-12-12 LAB
BILIRUB UR QL STRIP: NEGATIVE
CLARITY UR REFRACT.AUTO: CLEAR
COLOR UR AUTO: YELLOW
GLUCOSE UR QL STRIP: NEGATIVE
HGB UR QL STRIP: ABNORMAL
KETONES UR QL STRIP: NEGATIVE
LEUKOCYTE ESTERASE UR QL STRIP: NEGATIVE
MICROSCOPIC COMMENT: NORMAL
NITRITE UR QL STRIP: NEGATIVE
PH UR STRIP: 5 [PH] (ref 5–8)
PROT UR QL STRIP: NEGATIVE
RBC #/AREA URNS AUTO: 3 /HPF (ref 0–4)
SP GR UR STRIP: 1.01 (ref 1–1.03)
SQUAMOUS #/AREA URNS AUTO: 0 /HPF
URN SPEC COLLECT METH UR: ABNORMAL
WBC #/AREA URNS AUTO: 1 /HPF (ref 0–5)

## 2018-12-12 PROCEDURE — 3008F BODY MASS INDEX DOCD: CPT | Mod: CPTII,S$GLB,, | Performed by: FAMILY MEDICINE

## 2018-12-12 PROCEDURE — 87086 URINE CULTURE/COLONY COUNT: CPT

## 2018-12-12 PROCEDURE — 99214 OFFICE O/P EST MOD 30 MIN: CPT | Mod: S$GLB,,, | Performed by: FAMILY MEDICINE

## 2018-12-12 PROCEDURE — 87491 CHLMYD TRACH DNA AMP PROBE: CPT

## 2018-12-12 PROCEDURE — 87591 N.GONORRHOEAE DNA AMP PROB: CPT

## 2018-12-12 PROCEDURE — 81001 URINALYSIS AUTO W/SCOPE: CPT

## 2018-12-12 PROCEDURE — 99999 PR PBB SHADOW E&M-EST. PATIENT-LVL IV: CPT | Mod: PBBFAC,,, | Performed by: FAMILY MEDICINE

## 2018-12-12 RX ORDER — LEVOFLOXACIN 500 MG/1
500 TABLET, FILM COATED ORAL DAILY
Qty: 10 TABLET | Refills: 0 | Status: SHIPPED | OUTPATIENT
Start: 2018-12-12 | End: 2018-12-22

## 2018-12-12 RX ORDER — CANDESARTAN CILEXETIL AND HYDROCHLOROTHIAZIDE 16; 12.5 MG/1; MG/1
1 TABLET ORAL DAILY
Qty: 90 TABLET | Refills: 3 | Status: SHIPPED | OUTPATIENT
Start: 2018-12-12 | End: 2019-11-18 | Stop reason: SDUPTHER

## 2018-12-12 RX ORDER — LOSARTAN POTASSIUM AND HYDROCHLOROTHIAZIDE 12.5; 1 MG/1; MG/1
1 TABLET ORAL DAILY
Qty: 30 TABLET | Refills: 3 | Status: CANCELLED | OUTPATIENT
Start: 2018-12-12 | End: 2019-12-12

## 2018-12-12 NOTE — PATIENT INSTRUCTIONS
Epididymitis  Inflammation of the epididymis can cause pain and swelling in your scrotum. The epididymis is a small tube next to the testicle that stores sperm. Epididymitis is usually caused by an infection. In sexually active men, it is often caused by a sexually transmitted disease (STD) such as chlamydia or gonorrhea. In boys and in men over 40, it can be from bacteria from other parts of the urinary tract (not an STD infection).  Symptoms may begin with pain in the lower belly (abdomen) or low back. The pain then spreads down into the scrotum. Usually only one side is affected. The testicle and scrotum swell and become very painful and red. You may have fever and a burning when passing urine. Sometimes you may have a discharge from the penis.  Treatment is with antibiotics, and anti-inflammatory and pain medicines. The condition should get better over the first few days of treatment. But it will take several weeks for all the swelling and discomfort to go away. If your healthcare provider suspects that an STD is the cause, your sexual partners may need to be treated.  Home care  The following will help you care for yourself at home:  · Support the scrotum. When lying down, place a rolled towel under the scrotum. When walking, use an athletic supporter or 2 pairs of jockey-style underwear.  · To relieve pain, put ice packs on the inflamed area. You can make your own ice pack by putting ice cubes in a sealed plastic bag wrapped in a thin towel.  · You may use over-the-counter medicines to control pain, unless another medicine was given. If you have chronic liver or kidney disease, talk with your healthcare provider before taking these medicines. Also talk with your provider if you've ever had a stomach ulcer or GI bleeding.  · Rest in bed for the first few days until the fever, pain, and swelling get better. It may take several weeks for all of the swelling to go away.  · Constipation can make you strain. This  makes the pain worse. Avoid constipation by eating natural laxatives such as prunes, fresh fruits, and whole-grain cereals. If necessary, use a mild over-the-counter laxative for constipation. Mineral oil can be used to keep the stools soft.  · Do not have sex until you have finished all treatment and all symptoms have cleared.  · Take all medicine as directed. Do not miss any doses and do not stop early even if you feel better.  Follow-up care  Follow up with your healthcare provider, or as advised, to be sure you are responding properly to treatment. If a culture was taken, you may call for the result as directed. A culture test can ensure that you are on the correct antibiotic.   When to seek medical advice  Call your healthcare provider right away if any of these occur:  · Fever of 100.4ºF (38ºC), or as directed by your healthcare provider  · Increasing pain or swelling of the testicle after starting treatment  · Pressure or pain in your bladder that gets worse  · Unable to pass urine for 8 hours  Date Last Reviewed: 9/1/2016  © 2526-8494 [employer]. [address] [place] [address], [place] [address]. All rights reserved. This information is not intended as a substitute for professional medical care. Always follow your healthcare professional's instructions.

## 2018-12-12 NOTE — PROGRESS NOTES
Subjective:       Patient ID: Nehemias Zhang is a 48 y.o. male.    Chief Complaint: Testicle Pain   Testicular pain x 1 week.  Gradual onset, unchanged  Located on the posterior left testicle.  Diffuse soreness.  Denies discharge, mass or rash.  Sexually active.    He has HTN ; chronic controlled and is on Losartan-HCTZ combo. Takes it 100% of the time  Also on ambine prn for insomnnia: chronic controlled  Past Medical History:   Diagnosis Date    Hyperlipidemia     Hypertension     Insomnia      Family History   Problem Relation Age of Onset    Heart disease Mother     Prostate cancer Brother     Colon cancer Maternal Grandfather     Colon cancer Paternal Grandfather      Social History     Socioeconomic History    Marital status: Single     Spouse name: Not on file    Number of children: Not on file    Years of education: Not on file    Highest education level: Not on file   Social Needs    Financial resource strain: Not on file    Food insecurity - worry: Not on file    Food insecurity - inability: Not on file    Transportation needs - medical: Not on file    Transportation needs - non-medical: Not on file   Occupational History    Not on file   Tobacco Use    Smoking status: Former Smoker     Last attempt to quit: 3/26/2018     Years since quittin.7    Smokeless tobacco: Never Used   Substance and Sexual Activity    Alcohol use: No    Drug use: No    Sexual activity: Yes     Partners: Female   Other Topics Concern    Not on file   Social History Narrative    Not on file     Outpatient Encounter Medications as of 2018   Medication Sig Dispense Refill    aspirin (ECOTRIN) 81 MG EC tablet Take 81 mg by mouth.      multivitamin (THERAGRAN) per tablet Take by mouth.      nitroGLYCERIN (NITROSTAT) 0.4 MG SL tablet Place 1 tablet (0.4 mg total) under the tongue every 5 (five) minutes as needed. 30 tablet 0    omega 3-dha-epa-fish oil 900-1,400 mg CpDR Take by mouth.       "[DISCONTINUED] losartan-hydrochlorothiazide 100-12.5 mg (HYZAAR) 100-12.5 mg Tab Take 1 tablet by mouth once daily. 30 tablet 1    candesartan-hydrochlorothiazide (ATACAND HCT) 16-12.5 mg per tablet Take 1 tablet by mouth once daily. 90 tablet 3    levoFLOXacin (LEVAQUIN) 500 MG tablet Take 1 tablet (500 mg total) by mouth once daily. for 10 days 10 tablet 0    zolpidem (AMBIEN) 10 mg Tab Take 1 tablet (10 mg total) by mouth nightly as needed. 30 tablet 3     No facility-administered encounter medications on file as of 12/12/2018.        Review of Systems   Constitutional: Negative for appetite change and fever.   HENT: Negative for congestion, facial swelling and voice change.    Eyes: Negative for discharge and itching.   Respiratory: Negative for cough, chest tightness and wheezing.    Cardiovascular: Negative.  Negative for chest pain and leg swelling.   Gastrointestinal: Negative for abdominal pain, nausea and vomiting.   Endocrine: Negative for cold intolerance and heat intolerance.   Genitourinary: Positive for testicular pain. Negative for dysuria and flank pain.   Musculoskeletal: Negative for myalgias and neck stiffness.   Skin: Negative for pallor and rash.   Neurological: Negative for facial asymmetry and weakness.   Psychiatric/Behavioral: Negative for agitation and confusion.       Objective:      /84 (BP Location: Right arm, Patient Position: Sitting, BP Method: Medium (Manual))   Pulse 83   Temp 98.6 °F (37 °C) (Oral)   Ht 5' 10" (1.778 m)   Wt 93.8 kg (206 lb 12.7 oz)   SpO2 98%   BMI 29.67 kg/m²   Physical Exam   Constitutional: He is oriented to person, place, and time. He appears well-developed. No distress.   HENT:   Head: Normocephalic and atraumatic.   Right Ear: External ear normal.   Left Ear: External ear normal.   Eyes: Conjunctivae and EOM are normal.   Neck: Normal range of motion. Neck supple.   Cardiovascular: Normal rate and regular rhythm.   Pulmonary/Chest: Effort " normal and breath sounds normal. No respiratory distress.   Abdominal: Soft. Normal appearance and bowel sounds are normal. There is no hepatosplenomegaly.   Genitourinary:   Genitourinary Comments: Diffuse edema and tenderness to the left testicules   Musculoskeletal: He exhibits no edema.   Neurological: He is alert and oriented to person, place, and time.   Skin: Skin is warm and dry.   Psychiatric: He has a normal mood and affect. His behavior is normal.   Nursing note and vitals reviewed.      Results for orders placed or performed in visit on 10/24/18   2D echo with color flow doppler   Result Value Ref Range    QEF 60 55 - 65    Diastolic Dysfunction No     Est. PA Systolic Pressure 21.69     Tricuspid Valve Regurgitation TRIVIAL      Assessment:       1. HTN, goal below 130/80    2. Insomnia, unspecified type    3. Left epididymitis        Plan:   HTN, goal below 130/80 chronic stable, will change med due to recall  -     candesartan-hydrochlorothiazide (ATACAND HCT) 16-12.5 mg per tablet; Take 1 tablet by mouth once daily.  Dispense: 90 tablet; Refill: 3    Insomnia, unspecified type  Stable, takes Ambien prn  Left epididymitis  Will rule out pathology including GC/CH with UA & Cx and NAAT   Empirically initiate Levaquin to cover enterics.  RTC if worsening. Consider hernia check      levoFLOXacin (LEVAQUIN) 500 MG tablet; Take 1 tablet (500 mg total) by mouth once daily. for 10 days  Dispense: 10 tablet; Refill: 0

## 2018-12-13 LAB
C TRACH DNA SPEC QL NAA+PROBE: NOT DETECTED
N GONORRHOEA DNA SPEC QL NAA+PROBE: NOT DETECTED

## 2018-12-14 LAB — BACTERIA UR CULT: NO GROWTH

## 2019-03-01 ENCOUNTER — OFFICE VISIT (OUTPATIENT)
Dept: FAMILY MEDICINE | Facility: CLINIC | Age: 49
End: 2019-03-01
Payer: COMMERCIAL

## 2019-03-01 ENCOUNTER — PATIENT MESSAGE (OUTPATIENT)
Dept: FAMILY MEDICINE | Facility: CLINIC | Age: 49
End: 2019-03-01

## 2019-03-01 VITALS
DIASTOLIC BLOOD PRESSURE: 79 MMHG | TEMPERATURE: 99 F | BODY MASS INDEX: 30.38 KG/M2 | HEART RATE: 78 BPM | OXYGEN SATURATION: 98 % | SYSTOLIC BLOOD PRESSURE: 121 MMHG | WEIGHT: 211.75 LBS

## 2019-03-01 DIAGNOSIS — E78.5 HYPERLIPIDEMIA, UNSPECIFIED HYPERLIPIDEMIA TYPE: ICD-10-CM

## 2019-03-01 DIAGNOSIS — J06.9 UPPER RESPIRATORY TRACT INFECTION, UNSPECIFIED TYPE: Primary | ICD-10-CM

## 2019-03-01 PROCEDURE — 3078F PR MOST RECENT DIASTOLIC BLOOD PRESSURE < 80 MM HG: ICD-10-PCS | Mod: CPTII,S$GLB,, | Performed by: FAMILY MEDICINE

## 2019-03-01 PROCEDURE — 99214 PR OFFICE/OUTPT VISIT, EST, LEVL IV, 30-39 MIN: ICD-10-PCS | Mod: 25,S$GLB,, | Performed by: FAMILY MEDICINE

## 2019-03-01 PROCEDURE — 3074F SYST BP LT 130 MM HG: CPT | Mod: CPTII,S$GLB,, | Performed by: FAMILY MEDICINE

## 2019-03-01 PROCEDURE — 96372 PR INJECTION,THERAP/PROPH/DIAG2ST, IM OR SUBCUT: ICD-10-PCS | Mod: S$GLB,,, | Performed by: FAMILY MEDICINE

## 2019-03-01 PROCEDURE — 96372 THER/PROPH/DIAG INJ SC/IM: CPT | Mod: S$GLB,,, | Performed by: FAMILY MEDICINE

## 2019-03-01 PROCEDURE — 3008F BODY MASS INDEX DOCD: CPT | Mod: CPTII,S$GLB,, | Performed by: FAMILY MEDICINE

## 2019-03-01 PROCEDURE — 99999 PR PBB SHADOW E&M-EST. PATIENT-LVL IV: ICD-10-PCS | Mod: PBBFAC,,, | Performed by: FAMILY MEDICINE

## 2019-03-01 PROCEDURE — 99214 OFFICE O/P EST MOD 30 MIN: CPT | Mod: 25,S$GLB,, | Performed by: FAMILY MEDICINE

## 2019-03-01 PROCEDURE — 3078F DIAST BP <80 MM HG: CPT | Mod: CPTII,S$GLB,, | Performed by: FAMILY MEDICINE

## 2019-03-01 PROCEDURE — 3074F PR MOST RECENT SYSTOLIC BLOOD PRESSURE < 130 MM HG: ICD-10-PCS | Mod: CPTII,S$GLB,, | Performed by: FAMILY MEDICINE

## 2019-03-01 PROCEDURE — 99999 PR PBB SHADOW E&M-EST. PATIENT-LVL IV: CPT | Mod: PBBFAC,,, | Performed by: FAMILY MEDICINE

## 2019-03-01 PROCEDURE — 3008F PR BODY MASS INDEX (BMI) DOCUMENTED: ICD-10-PCS | Mod: CPTII,S$GLB,, | Performed by: FAMILY MEDICINE

## 2019-03-01 RX ORDER — MINERAL OIL
180 ENEMA (ML) RECTAL DAILY
Qty: 30 TABLET | Refills: 0 | Status: SHIPPED | OUTPATIENT
Start: 2019-03-01 | End: 2021-03-22

## 2019-03-01 RX ORDER — ZOLPIDEM TARTRATE 10 MG/1
10 TABLET ORAL NIGHTLY PRN
Refills: 3 | COMMUNITY
Start: 2019-02-06 | End: 2019-05-15 | Stop reason: SDUPTHER

## 2019-03-01 RX ORDER — PREDNISONE 20 MG/1
20 TABLET ORAL DAILY
Qty: 5 TABLET | Refills: 0 | Status: SHIPPED | OUTPATIENT
Start: 2019-03-01 | End: 2019-03-06

## 2019-03-01 RX ORDER — BETAMETHASONE SODIUM PHOSPHATE AND BETAMETHASONE ACETATE 3; 3 MG/ML; MG/ML
12 INJECTION, SUSPENSION INTRA-ARTICULAR; INTRALESIONAL; INTRAMUSCULAR; SOFT TISSUE
Status: COMPLETED | OUTPATIENT
Start: 2019-03-01 | End: 2019-03-01

## 2019-03-01 RX ADMIN — BETAMETHASONE SODIUM PHOSPHATE AND BETAMETHASONE ACETATE 12 MG: 3; 3 INJECTION, SUSPENSION INTRA-ARTICULAR; INTRALESIONAL; INTRAMUSCULAR; SOFT TISSUE at 09:03

## 2019-03-01 NOTE — PROGRESS NOTES
Admin betamethasone 12 mg im to rv. Tolerated well. Recommended to wait for 15 mins for adverse reactions. Left in stable condition.

## 2019-03-01 NOTE — PATIENT INSTRUCTIONS
Viral Upper Respiratory Illness (Adult)  You have a viral upper respiratory illness (URI), which is another term for the common cold. This illness is contagious during the first few days. It is spread through the air by coughing and sneezing. It may also be spread by direct contact (touching the sick person and then touching your own eyes, nose, or mouth). Frequent handwashing will decrease risk of spread. Most viral illnesses go away within 7 to 10 days with rest and simple home remedies. Sometimes the illness may last for several weeks. Antibiotics will not kill a virus, and they are generally not prescribed for this condition.    Home care  · If symptoms are severe, rest at home for the first 2 to 3 days. When you resume activity, don't let yourself get too tired.  · Avoid being exposed to cigarette smoke (yours or others).  · You may use acetaminophen or ibuprofen to control pain and fever, unless another medicine was prescribed. (Note: If you have chronic liver or kidney disease, have ever had a stomach ulcer or gastrointestinal bleeding, or are taking blood-thinning medicines, talk with your healthcare provider before using these medicines.) Aspirin should never be given to anyone under 18 years of age who is ill with a viral infection or fever. It may cause severe liver or brain damage.  · Your appetite may be poor, so a light diet is fine. Avoid dehydration by drinking 6 to 8 glasses of fluids per day (water, soft drinks, juices, tea, or soup). Extra fluids will help loosen secretions in the nose and lungs.  · Over-the-counter cold medicines will not shorten the length of time youre sick, but they may be helpful for the following symptoms: cough, sore throat, and nasal and sinus congestion. (Note: Do not use decongestants if you have high blood pressure.)  Follow-up care  Follow up with your healthcare provider, or as advised.  When to seek medical advice  Call your healthcare provider right away if any  of these occur:  · Cough with lots of colored sputum (mucus)  · Severe headache; face, neck, or ear pain  · Difficulty swallowing due to throat pain  · Fever of 100.4°F (38°C)  Call 911, or get immediate medical care  Call emergency services right away if any of these occur:  · Chest pain, shortness of breath, wheezing, or difficulty breathing  · Coughing up blood  · Inability to swallow due to throat pain  Date Last Reviewed: 9/13/2015  © 3897-3791 [employer]. [address] [place] [address]. All rights reserved. This information is not intended as a substitute for professional medical care. Always follow your healthcare professional's instructions.

## 2019-03-01 NOTE — PROGRESS NOTES
Subjective:       Patient ID: Nehemias Zhang is a 48 y.o. male.    Chief Complaint: Nasal Congestion (runny nose); itchy eyes; and sinus drip      HPI     Nasal Congestion      Additional comments: runny nose          Last edited by Jesenia Sanderson LPN on 3/1/2019  9:03 AM. (History)         Upper Respiratory Infection: Patient complains of symptoms of a URI. Symptoms include rhinorrhea, malaise and sinus pressure. Onset of symptoms was 2 days ago, gradually worsening since that time. He also c/o no  fever for the past 2 days .  He is drinking plenty of fluids. Evaluation to date: none. Treatment to date: none He took from work yesterday and today.  Hyperlipidemia: mixed type, chronic.  He is on Omega 3 fatty acid.  Reports that his diet could be better.  Past Medical History:   Diagnosis Date    Hyperlipidemia     Hypertension     Insomnia      Family History   Problem Relation Age of Onset    Heart disease Mother     Prostate cancer Brother     Colon cancer Maternal Grandfather     Colon cancer Paternal Grandfather      Social History     Socioeconomic History    Marital status: Single     Spouse name: Not on file    Number of children: Not on file    Years of education: Not on file    Highest education level: Not on file   Social Needs    Financial resource strain: Not on file    Food insecurity - worry: Not on file    Food insecurity - inability: Not on file    Transportation needs - medical: Not on file    Transportation needs - non-medical: Not on file   Occupational History    Not on file   Tobacco Use    Smoking status: Former Smoker     Last attempt to quit: 3/26/2018     Years since quittin.9    Smokeless tobacco: Never Used   Substance and Sexual Activity    Alcohol use: No    Drug use: No    Sexual activity: Yes     Partners: Female   Other Topics Concern    Not on file   Social History Narrative    Not on file     Outpatient Encounter Medications as of 3/1/2019    Medication Sig Dispense Refill    aspirin (ECOTRIN) 81 MG EC tablet Take 81 mg by mouth.      candesartan-hydrochlorothiazide (ATACAND HCT) 16-12.5 mg per tablet Take 1 tablet by mouth once daily. 90 tablet 3    nitroGLYCERIN (NITROSTAT) 0.4 MG SL tablet Place 1 tablet (0.4 mg total) under the tongue every 5 (five) minutes as needed. 30 tablet 0    omega 3-dha-epa-fish oil 900-1,400 mg CpDR Take by mouth once daily.       zolpidem (AMBIEN) 10 mg Tab Take 10 mg by mouth nightly as needed.   3    fexofenadine (ALLEGRA) 180 MG tablet Take 1 tablet (180 mg total) by mouth once daily. 30 tablet 0    predniSONE (DELTASONE) 20 MG tablet Take 1 tablet (20 mg total) by mouth once daily. for 5 days 5 tablet 0    [DISCONTINUED] zolpidem (AMBIEN) 10 mg Tab Take 1 tablet (10 mg total) by mouth nightly as needed. 30 tablet 3     Facility-Administered Encounter Medications as of 3/1/2019   Medication Dose Route Frequency Provider Last Rate Last Dose    [COMPLETED] betamethasone acetate-betamethasone sodium phosphate injection 12 mg  12 mg Intramuscular 1 time in Clinic/HOD Bozena Guzman MD   12 mg at 03/01/19 0921       Review of Systems   Constitutional: Negative for appetite change and fever.   HENT: Positive for postnasal drip, rhinorrhea and sinus pressure. Negative for congestion, facial swelling, sinus pain, sneezing, sore throat and voice change.    Eyes: Positive for discharge and itching.   Respiratory: Positive for cough. Negative for chest tightness and wheezing.    Cardiovascular: Negative.  Negative for chest pain and leg swelling.   Gastrointestinal: Negative for abdominal pain, nausea and vomiting.   Endocrine: Negative for cold intolerance and heat intolerance.   Genitourinary: Negative for dysuria and flank pain.   Musculoskeletal: Negative for myalgias and neck stiffness.   Skin: Negative for pallor and rash.   Neurological: Negative for facial asymmetry and weakness.   Psychiatric/Behavioral:  Negative for agitation and confusion.       Objective:      /79 (BP Location: Right arm, Patient Position: Sitting, BP Method: Medium (Automatic))   Pulse 78   Temp 98.5 °F (36.9 °C) (Oral)   Wt 96.1 kg (211 lb 12 oz)   SpO2 98%   BMI 30.38 kg/m²   Physical Exam   Constitutional: He is oriented to person, place, and time. He appears well-developed. No distress.   HENT:   Head: Normocephalic and atraumatic.   Right Ear: Tympanic membrane and external ear normal.   Left Ear: Tympanic membrane and external ear normal.   Nose: Mucosal edema and rhinorrhea present. Right sinus exhibits no maxillary sinus tenderness and no frontal sinus tenderness. Left sinus exhibits no frontal sinus tenderness.   Mouth/Throat: Mucous membranes are not dry. No oropharyngeal exudate, posterior oropharyngeal edema or posterior oropharyngeal erythema.   Eyes: EOM are normal. Right conjunctiva is not injected. Left conjunctiva is not injected.   teary   Neck: Neck supple.   Cardiovascular: Normal rate and regular rhythm.   Pulmonary/Chest: Effort normal. No respiratory distress.   Abdominal: Soft. Normal appearance and bowel sounds are normal. There is no hepatosplenomegaly.   Genitourinary:   Genitourinary Comments: deferred   Musculoskeletal: He exhibits no edema.   Neurological: He is alert and oriented to person, place, and time.   Skin: Skin is warm and dry.   Psychiatric: He has a normal mood and affect. His behavior is normal.   Nursing note and vitals reviewed.      Lab Results   Component Value Date    CHOL 218 (H) 10/17/2018     Lab Results   Component Value Date    HDL 41 10/17/2018     Lab Results   Component Value Date    LDLCALC 112.0 10/17/2018     Lab Results   Component Value Date    TRIG 325 (H) 10/17/2018     Lab Results   Component Value Date    CHOLHDL 18.8 (L) 10/17/2018     Assessment:       1. Upper respiratory tract infection, unspecified type    2. Hyperlipidemia, unspecified hyperlipidemia type         Plan:   Upper respiratory tract infection, unspecified type  -     betamethasone acetate-betamethasone sodium phosphate injection 12 mg    Hyperlipidemia, unspecified hyperlipidemia type  -     Lipid panel; Future; Expected date: 04/17/2019    Other orders  -     fexofenadine (ALLEGRA) 180 MG tablet; Take 1 tablet (180 mg total) by mouth once daily.  Dispense: 30 tablet; Refill: 0  -     predniSONE (DELTASONE) 20 MG tablet; Take 1 tablet (20 mg total) by mouth once daily. for 5 days  Dispense: 5 tablet; Refill: 0, start tomorrow.  Rest and Increase oral  fluids  Wash hands frequently  Tylenol or Motrin every 8 hours as needed for fever, aches, or pain      Total time spent in face to face counseling and coordination of care - 25 minutes over 50% of time was used in discussion of prognosis, risks, benefits of treatment, instructions and compliance with regimen .

## 2019-03-14 ENCOUNTER — TELEPHONE (OUTPATIENT)
Dept: FAMILY MEDICINE | Facility: CLINIC | Age: 49
End: 2019-03-14

## 2019-03-15 ENCOUNTER — OFFICE VISIT (OUTPATIENT)
Dept: FAMILY MEDICINE | Facility: CLINIC | Age: 49
End: 2019-03-15
Payer: COMMERCIAL

## 2019-03-15 ENCOUNTER — LAB VISIT (OUTPATIENT)
Dept: LAB | Facility: HOSPITAL | Age: 49
End: 2019-03-15
Attending: FAMILY MEDICINE
Payer: COMMERCIAL

## 2019-03-15 VITALS
HEIGHT: 70 IN | TEMPERATURE: 98 F | SYSTOLIC BLOOD PRESSURE: 102 MMHG | DIASTOLIC BLOOD PRESSURE: 70 MMHG | HEART RATE: 76 BPM | BODY MASS INDEX: 30.85 KG/M2 | WEIGHT: 215.5 LBS | OXYGEN SATURATION: 97 %

## 2019-03-15 DIAGNOSIS — Z12.5 SCREENING PSA (PROSTATE SPECIFIC ANTIGEN): ICD-10-CM

## 2019-03-15 DIAGNOSIS — E66.9 OBESITY (BMI 30-39.9): ICD-10-CM

## 2019-03-15 DIAGNOSIS — Z28.21 INFLUENZA VACCINATION DECLINED: ICD-10-CM

## 2019-03-15 DIAGNOSIS — E78.2 MIXED HYPERLIPIDEMIA: Primary | ICD-10-CM

## 2019-03-15 DIAGNOSIS — E78.2 MIXED HYPERLIPIDEMIA: ICD-10-CM

## 2019-03-15 DIAGNOSIS — G47.00 INSOMNIA, CONTROLLED: ICD-10-CM

## 2019-03-15 DIAGNOSIS — I10 ESSENTIAL HYPERTENSION: ICD-10-CM

## 2019-03-15 PROBLEM — D75.839 THROMBOCYTOSIS: Status: RESOLVED | Noted: 2018-01-10 | Resolved: 2019-03-15

## 2019-03-15 LAB
CHOLEST SERPL-MCNC: 221 MG/DL
CHOLEST/HDLC SERPL: 4.8 {RATIO}
COMPLEXED PSA SERPL-MCNC: 0.73 NG/ML
ESTIMATED AVG GLUCOSE: 117 MG/DL
HBA1C MFR BLD HPLC: 5.7 %
HDLC SERPL-MCNC: 46 MG/DL
HDLC SERPL: 20.8 %
LDLC SERPL CALC-MCNC: 146.2 MG/DL
NONHDLC SERPL-MCNC: 175 MG/DL
TRIGL SERPL-MCNC: 144 MG/DL

## 2019-03-15 PROCEDURE — 99214 PR OFFICE/OUTPT VISIT, EST, LEVL IV, 30-39 MIN: ICD-10-PCS | Mod: S$GLB,,, | Performed by: FAMILY MEDICINE

## 2019-03-15 PROCEDURE — 84153 ASSAY OF PSA TOTAL: CPT

## 2019-03-15 PROCEDURE — 99999 PR PBB SHADOW E&M-EST. PATIENT-LVL III: ICD-10-PCS | Mod: PBBFAC,,, | Performed by: FAMILY MEDICINE

## 2019-03-15 PROCEDURE — 3008F BODY MASS INDEX DOCD: CPT | Mod: CPTII,S$GLB,, | Performed by: FAMILY MEDICINE

## 2019-03-15 PROCEDURE — 36415 COLL VENOUS BLD VENIPUNCTURE: CPT | Mod: PO

## 2019-03-15 PROCEDURE — 99999 PR PBB SHADOW E&M-EST. PATIENT-LVL III: CPT | Mod: PBBFAC,,, | Performed by: FAMILY MEDICINE

## 2019-03-15 PROCEDURE — 80061 LIPID PANEL: CPT

## 2019-03-15 PROCEDURE — 83036 HEMOGLOBIN GLYCOSYLATED A1C: CPT

## 2019-03-15 PROCEDURE — 3074F PR MOST RECENT SYSTOLIC BLOOD PRESSURE < 130 MM HG: ICD-10-PCS | Mod: CPTII,S$GLB,, | Performed by: FAMILY MEDICINE

## 2019-03-15 PROCEDURE — 3008F PR BODY MASS INDEX (BMI) DOCUMENTED: ICD-10-PCS | Mod: CPTII,S$GLB,, | Performed by: FAMILY MEDICINE

## 2019-03-15 PROCEDURE — 99214 OFFICE O/P EST MOD 30 MIN: CPT | Mod: S$GLB,,, | Performed by: FAMILY MEDICINE

## 2019-03-15 PROCEDURE — 3078F DIAST BP <80 MM HG: CPT | Mod: CPTII,S$GLB,, | Performed by: FAMILY MEDICINE

## 2019-03-15 PROCEDURE — 3078F PR MOST RECENT DIASTOLIC BLOOD PRESSURE < 80 MM HG: ICD-10-PCS | Mod: CPTII,S$GLB,, | Performed by: FAMILY MEDICINE

## 2019-03-15 PROCEDURE — 3074F SYST BP LT 130 MM HG: CPT | Mod: CPTII,S$GLB,, | Performed by: FAMILY MEDICINE

## 2019-03-15 NOTE — PROGRESS NOTES
Subjective:       Patient ID: Nehemias Zhang is a 48 y.o. male.    Chief Complaint:  Follow-up on hypertension, insomnia, hyperlipidemia, obesity.    HTN: chronic. Here for follow up on elevated blood pressure on his previous visit.  BP is controlled on Atacand-HCT which she takes 100% of the time..     Insomnia:  Chronic stable, Takes ambien rarely because he forgets things the next day. Thiamine and melatonin has been helping him.    Hyperlipidemia ; chronic.  On Omega 3 fatty acid.  Due for a repeat level check.    Obesity: worsening. Reports that diet could be better and he is planning on resuming walking outside, treadmill hurts his feet.    Health maintenance ;  Reports that he got tetanus vaccine last year and does not want the flu vaccine.  Past Medical History:   Diagnosis Date    Hyperlipidemia     Hypertension     Insomnia      Family History   Problem Relation Age of Onset    Heart disease Mother     Prostate cancer Brother     Colon cancer Maternal Grandfather     Colon cancer Paternal Grandfather      Social History     Socioeconomic History    Marital status: Single     Spouse name: Not on file    Number of children: Not on file    Years of education: Not on file    Highest education level: Not on file   Social Needs    Financial resource strain: Not on file    Food insecurity - worry: Not on file    Food insecurity - inability: Not on file    Transportation needs - medical: Not on file    Transportation needs - non-medical: Not on file   Occupational History    Not on file   Tobacco Use    Smoking status: Former Smoker     Last attempt to quit: 3/26/2018     Years since quittin.9    Smokeless tobacco: Never Used   Substance and Sexual Activity    Alcohol use: No    Drug use: No    Sexual activity: Yes     Partners: Female   Other Topics Concern    Not on file   Social History Narrative    Not on file     Outpatient Encounter Medications as of 3/15/2019   Medication Sig  "Dispense Refill    aspirin (ECOTRIN) 81 MG EC tablet Take 81 mg by mouth.      candesartan-hydrochlorothiazide (ATACAND HCT) 16-12.5 mg per tablet Take 1 tablet by mouth once daily. 90 tablet 3    fexofenadine (ALLEGRA) 180 MG tablet Take 1 tablet (180 mg total) by mouth once daily. 30 tablet 0    nitroGLYCERIN (NITROSTAT) 0.4 MG SL tablet Place 1 tablet (0.4 mg total) under the tongue every 5 (five) minutes as needed. 30 tablet 0    omega 3-dha-epa-fish oil 900-1,400 mg CpDR Take by mouth once daily.       zolpidem (AMBIEN) 10 mg Tab Take 10 mg by mouth nightly as needed.   3     No facility-administered encounter medications on file as of 3/15/2019.        Review of Systems   Constitutional: Negative for appetite change and fever.   HENT: Negative for congestion, facial swelling and voice change.    Eyes: Negative for discharge and itching.   Respiratory: Negative for cough, chest tightness and wheezing.    Cardiovascular: Negative.  Negative for chest pain and leg swelling.   Gastrointestinal: Negative for abdominal pain, nausea and vomiting.   Endocrine: Negative for cold intolerance and heat intolerance.   Genitourinary: Negative for dysuria and flank pain.   Musculoskeletal: Negative for myalgias and neck stiffness.   Skin: Negative for pallor and rash.   Neurological: Negative for facial asymmetry and weakness.   Psychiatric/Behavioral: Negative for agitation and confusion.       Objective:      /70 (BP Location: Right arm, Patient Position: Sitting, BP Method: Large (Manual))   Pulse 76   Temp 98.4 °F (36.9 °C) (Oral)   Ht 5' 10" (1.778 m)   Wt 97.7 kg (215 lb 8 oz)   SpO2 97%   BMI 30.92 kg/m²   Physical Exam   Constitutional: He is oriented to person, place, and time. He appears well-developed. No distress.   HENT:   Head: Normocephalic and atraumatic.   Right Ear: External ear normal.   Left Ear: External ear normal.   Eyes: Conjunctivae and EOM are normal.   Neck: Neck supple. "   Cardiovascular: Normal rate and regular rhythm.   Pulmonary/Chest: Effort normal. No respiratory distress.   Abdominal: Soft. Normal appearance and bowel sounds are normal. There is no hepatosplenomegaly.   Genitourinary:   Genitourinary Comments: deferred   Musculoskeletal: He exhibits no edema.   Neurological: He is alert and oriented to person, place, and time.   Skin: Skin is warm and dry.   Psychiatric: He has a normal mood and affect. His behavior is normal.   Nursing note and vitals reviewed.        Lab Results   Component Value Date    WBC 8.53 10/17/2018    HGB 16.4 10/17/2018    HCT 47.7 10/17/2018    MCV 93 10/17/2018     (H) 10/17/2018       Chemistry        Component Value Date/Time     10/17/2018 1112    K 4.0 10/17/2018 1112     10/17/2018 1112    CO2 29 10/17/2018 1112    BUN 9 10/17/2018 1112    CREATININE 0.8 10/17/2018 1112    GLU 89 10/17/2018 1112        Component Value Date/Time    CALCIUM 10.3 10/17/2018 1112    ALKPHOS 60 10/17/2018 1112    AST 19 10/17/2018 1112    ALT 30 10/17/2018 1112    BILITOT 0.5 10/17/2018 1112    ESTGFRAFRICA >60.0 10/17/2018 1112    EGFRNONAA >60.0 10/17/2018 1112        Lab Results   Component Value Date    CHOL 218 (H) 10/17/2018     Lab Results   Component Value Date    HDL 41 10/17/2018     Lab Results   Component Value Date    LDLCALC 112.0 10/17/2018     Lab Results   Component Value Date    TRIG 325 (H) 10/17/2018     Lab Results   Component Value Date    CHOLHDL 18.8 (L) 10/17/2018     Assessment:       1. Mixed hyperlipidemia    2. Obesity (BMI 30-39.9)    3. Insomnia, controlled    4. Influenza vaccination declined    5. Essential hypertension    6. Screening PSA (prostate specific antigen)        Plan:   Mixed hyperlipidemia  -     Lipid panel; Future; Expected date: 03/15/2019    Obesity (BMI 30-39.9)  -     Hemoglobin A1c; Future; Expected date: 03/15/2019    Insomnia, controlled    Influenza vaccination declined    Essential  hypertension  controlled  Screening PSA (prostate specific antigen)  -     PSA, Screening; Future; Expected date: 03/15/2019

## 2019-03-15 NOTE — PATIENT INSTRUCTIONS
Prevention Guidelines, Men Ages 40 to 49  Screening tests and vaccines are an important part of managing your health. Health counseling is essential, too. Below are guidelines for these, for men ages 40 to 49. Talk with your healthcare provider to make sure youre up to date on what you need.  Screening Who needs it How often   Alcohol misuse All men in this age group At routine exams   Blood pressure All men in this age group Every 2 years if your blood pressure reading is less than 120/80 mm Hg; yearly if your systolic blood pressure reading is 120 to 139 mm Hg, or your diastolic blood pressure reading is 80 to 89 mm Hg   Depression All men in this age group At routine exams   Type 2 diabetes or prediabetes All adults beginning at age 45 and adults without symptoms at any age who are overweight or obese and have 1 or more other risk factors for diabetes At least every 3 years (yearly if blood sugar has begun to rise)   Hepatitis C Men at increased risk for infection - talk with your healthcare provider At routine exams   High cholesterol or triglycerides All men ages 35 and older, and younger men at high risk for coronary artery disease At least every 5 years   HIV All men At routine exams   Obesity All men in this age group At routine exams   Prostate cancer Starting at age 45, talk to healthcare provider about risks and benefits of digital rectal exam (WESLY) and prostate-specific antigen (PSA) screening1 At routine exams   Syphilis Men at increased risk for infection - talk with your healthcare provider At routine exams   Tuberculosis Men at increased risk for infection - talk with your healthcare provider Check with your healthcare provider   Vision All men in this age group Every 2 to 4 years if no risk factors for eye disease2   Vaccine Who needs it How often   Chickenpox (varicella) All men in this age group who have no record of this infection or vaccine 2 doses; the second dose should be given at least 4  weeks after the first dose   Hepatitis A Men at increased risk for infection - talk with your healthcare provider 2 doses given at least 6 months apart   Hepatitis B Men at increased risk for infection - talk with your healthcare provider 3 doses over 6 months; second dose should be given 1 month after the first dose; the third dose should be given at least 2 months after the second dose and at least 4 months after the first dose   Haemophilus influenzae Type B (HIB) Men at increased risk for infection - talk with your healthcare provider 1 to 3 doses   Influenza (flu) All men in this age group Once a year   Measles, mumps, rubella (MMR) All men in this age group who have no record of these infections or vaccines 1 or 2 doses   Meningococcal Men at increased risk for infection - talk with your healthcare provider 1 or more doses   Pneumococcal conjugate vaccine (PCV13) and pneumococcal polysaccharide vaccine (PPSV23) Men at increased risk for infection - talk with your healthcare provider PCV13: 1 dose ages 19 to 65 (protects against 13 types of pneumococcal bacteria)     PPSV23: 1 to 2 doses through age 64, or 1 dose at 65 or older (protects against 23 types of pneumococcal bacteria)      Tetanus/diphtheria/  pertussis (Td/Tdap) booster All men in this age group Td every 10 years, or a one-time dose of Tdap instead of a Td booster after age 18, then Td every 10 years   Counseling Who needs it How often   Diet and exercise Men who are overweight or obese When diagnosed, and then at routine exams   Sexually transmitted infection prevention Men at increased risk for infection - talk with your healthcare provider At routine exams   Use of daily aspirin Men ages 45 to 79 at risk for cardiovascular health problems At routine exams   Use of tobacco and the health effects it can cause All men in this age group Every exam   [address] [place] [address] [facility]   [facility] [employer]  Date Last Reviewed:  2/1/2017  © 7134-5613 [employer]. [address] [place] [address], [place] [address]. All rights reserved. This information is not intended as a substitute for professional medical care. Always follow your healthcare professional's instructions.

## 2019-04-24 DIAGNOSIS — G47.00 INSOMNIA, UNSPECIFIED TYPE: ICD-10-CM

## 2019-04-25 RX ORDER — ZOLPIDEM TARTRATE 10 MG/1
TABLET ORAL
Qty: 30 TABLET | OUTPATIENT
Start: 2019-04-25

## 2019-05-15 DIAGNOSIS — G47.09 OTHER INSOMNIA: Primary | ICD-10-CM

## 2019-05-15 RX ORDER — ZOLPIDEM TARTRATE 10 MG/1
10 TABLET ORAL NIGHTLY PRN
Qty: 30 TABLET | Refills: 0 | Status: SHIPPED | OUTPATIENT
Start: 2019-05-15 | End: 2019-06-19 | Stop reason: SDUPTHER

## 2019-05-15 NOTE — TELEPHONE ENCOUNTER
----- Message from Shae Mejia sent at 5/15/2019  3:26 PM CDT -----  .Type:  RX Refill Request    Who Called: patient  Refill or New Rx:refill  RX Name and Strength:generic ambien (unsure of mg)  How is the patient currently taking it? (ex. 1XDay):as needed  Is this a 30 day or 90 day RX:30  Preferred Pharmacy with phone number:.  [facility]/pharmacy #[id number] - [place] - [address]  [address] [facility] [facility] [address]  Phone: [phone] Fax: [phone]    Local or Mail Order:local  Ordering Provider:carlos  Would the patient rather a call back or a response via [facility]? call  Best Call Back Number:.[phone]  Additional Information:

## 2019-05-31 ENCOUNTER — TELEPHONE (OUTPATIENT)
Dept: FAMILY MEDICINE | Facility: CLINIC | Age: 49
End: 2019-05-31

## 2019-05-31 ENCOUNTER — LAB VISIT (OUTPATIENT)
Dept: LAB | Facility: HOSPITAL | Age: 49
End: 2019-05-31
Attending: FAMILY MEDICINE
Payer: COMMERCIAL

## 2019-05-31 DIAGNOSIS — G47.33 OSA (OBSTRUCTIVE SLEEP APNEA): Primary | ICD-10-CM

## 2019-05-31 DIAGNOSIS — E78.5 HYPERLIPIDEMIA, UNSPECIFIED HYPERLIPIDEMIA TYPE: ICD-10-CM

## 2019-05-31 LAB
CHOLEST SERPL-MCNC: 189 MG/DL (ref 120–199)
CHOLEST/HDLC SERPL: 4.7 {RATIO} (ref 2–5)
HDLC SERPL-MCNC: 40 MG/DL (ref 40–75)
HDLC SERPL: 21.2 % (ref 20–50)
LDLC SERPL CALC-MCNC: 119.2 MG/DL (ref 63–159)
NONHDLC SERPL-MCNC: 149 MG/DL
TRIGL SERPL-MCNC: 149 MG/DL (ref 30–150)

## 2019-05-31 PROCEDURE — 80061 LIPID PANEL: CPT

## 2019-05-31 PROCEDURE — 36415 COLL VENOUS BLD VENIPUNCTURE: CPT | Mod: PO

## 2019-06-19 ENCOUNTER — TELEPHONE (OUTPATIENT)
Dept: PULMONOLOGY | Facility: CLINIC | Age: 49
End: 2019-06-19

## 2019-06-19 ENCOUNTER — OFFICE VISIT (OUTPATIENT)
Dept: FAMILY MEDICINE | Facility: CLINIC | Age: 49
End: 2019-06-19
Payer: COMMERCIAL

## 2019-06-19 VITALS
WEIGHT: 209.13 LBS | OXYGEN SATURATION: 95 % | BODY MASS INDEX: 29.94 KG/M2 | DIASTOLIC BLOOD PRESSURE: 78 MMHG | HEART RATE: 78 BPM | TEMPERATURE: 98 F | HEIGHT: 70 IN | SYSTOLIC BLOOD PRESSURE: 104 MMHG

## 2019-06-19 DIAGNOSIS — G47.09 OTHER INSOMNIA: ICD-10-CM

## 2019-06-19 DIAGNOSIS — G47.33 OSA (OBSTRUCTIVE SLEEP APNEA): ICD-10-CM

## 2019-06-19 DIAGNOSIS — H69.92 DYSFUNCTION OF LEFT EUSTACHIAN TUBE: Primary | ICD-10-CM

## 2019-06-19 PROCEDURE — 3074F PR MOST RECENT SYSTOLIC BLOOD PRESSURE < 130 MM HG: ICD-10-PCS | Mod: CPTII,S$GLB,, | Performed by: FAMILY MEDICINE

## 2019-06-19 PROCEDURE — 99999 PR PBB SHADOW E&M-EST. PATIENT-LVL IV: ICD-10-PCS | Mod: PBBFAC,,, | Performed by: FAMILY MEDICINE

## 2019-06-19 PROCEDURE — 3008F PR BODY MASS INDEX (BMI) DOCUMENTED: ICD-10-PCS | Mod: CPTII,S$GLB,, | Performed by: FAMILY MEDICINE

## 2019-06-19 PROCEDURE — 3078F DIAST BP <80 MM HG: CPT | Mod: CPTII,S$GLB,, | Performed by: FAMILY MEDICINE

## 2019-06-19 PROCEDURE — 3078F PR MOST RECENT DIASTOLIC BLOOD PRESSURE < 80 MM HG: ICD-10-PCS | Mod: CPTII,S$GLB,, | Performed by: FAMILY MEDICINE

## 2019-06-19 PROCEDURE — 99214 OFFICE O/P EST MOD 30 MIN: CPT | Mod: S$GLB,,, | Performed by: FAMILY MEDICINE

## 2019-06-19 PROCEDURE — 99999 PR PBB SHADOW E&M-EST. PATIENT-LVL IV: CPT | Mod: PBBFAC,,, | Performed by: FAMILY MEDICINE

## 2019-06-19 PROCEDURE — 3008F BODY MASS INDEX DOCD: CPT | Mod: CPTII,S$GLB,, | Performed by: FAMILY MEDICINE

## 2019-06-19 PROCEDURE — 3074F SYST BP LT 130 MM HG: CPT | Mod: CPTII,S$GLB,, | Performed by: FAMILY MEDICINE

## 2019-06-19 PROCEDURE — 99214 PR OFFICE/OUTPT VISIT, EST, LEVL IV, 30-39 MIN: ICD-10-PCS | Mod: S$GLB,,, | Performed by: FAMILY MEDICINE

## 2019-06-19 RX ORDER — ZOLPIDEM TARTRATE 10 MG/1
10 TABLET ORAL NIGHTLY PRN
Qty: 30 TABLET | Refills: 2 | Status: SHIPPED | OUTPATIENT
Start: 2019-06-19 | End: 2019-10-16 | Stop reason: SDUPTHER

## 2019-06-19 NOTE — PATIENT INSTRUCTIONS
Common Middle Ear Problems    Your middle ear may have been injured or infected recently. Over time, certain growths or bone disease can also harm the middle ear. Left untreated, middle ear problems often lead to lifelong hearing loss. There are two types of hearing loss: conductive and sensorineural. One or both kinds can occur. Injury, infection, certain growths, or bone disease can cause your symptoms. A ruptured eardrum or a long-lasting (chronic) ear infection may be painful and decrease hearing.  Symptoms  · Hearing loss in one or both ears  · Fluid, often smelly, draining from the ear  · Pain, pressure, or discomfort in the ear  · Ringing in the ear  Conductive and sensorineural hearing loss  Sound waves may be disrupted before they reach the inner ear. If this happens, conductive hearing loss may occur. The ear canal can be blocked by wax, infection, a tumor, or a foreign object. The eardrum can be injured or infected. Abnormal bone growth, infection, or tumors in the middle ear can block sound waves.  Sound waves may not be processed correctly in the inner ear. If this happens, sensorineural hearing loss may occur. Permanent hearing loss is most commonly associated with sensorineural problems.  The tests and evaluations used to diagnose what type of hearing problem you have will depend on your symptoms.   Date Last Reviewed: 10/1/2016  © 7336-4819 [employer]. [address] [place] [address], [place] [address]. All rights reserved. This information is not intended as a substitute for professional medical care. Always follow your healthcare professional's instructions.

## 2019-06-19 NOTE — PROGRESS NOTES
Subjective:       Patient ID: Nehemias Zhang is a 48 y.o. male.    Chief Complaint: Ear Pressure  Left ear pressure: x 4 days. Associated with decreased hearing-muffled.   Denies tinnitus, ear ache or drainage. He joo Allegra daily for allergies and denies nasal congestion, sore throat or any other sxs.    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  Risk factor: obesity.    Insomnia: chronic, stable, sxs is controlled on Ambien 10 mg nightly which he does not  Take every night.      Past Medical History:   Diagnosis Date    Hyperlipidemia     Hypertension     Insomnia      Family History   Problem Relation Age of Onset    Heart disease Mother     Prostate cancer Brother     Colon cancer Maternal Grandfather     Colon cancer Paternal Grandfather      Social History     Socioeconomic History    Marital status: Single     Spouse name: Not on file    Number of children: Not on file    Years of education: Not on file    Highest education level: Not on file   Occupational History    Not on file   Social Needs    Financial resource strain: Not on file    Food insecurity:     Worry: Not on file     Inability: Not on file    Transportation needs:     Medical: Not on file     Non-medical: Not on file   Tobacco Use    Smoking status: Former Smoker     Last attempt to quit: 3/26/2018     Years since quittin.2    Smokeless tobacco: Never Used   Substance and Sexual Activity    Alcohol use: No    Drug use: No    Sexual activity: Yes     Partners: Female   Lifestyle    Physical activity:     Days per week: Not on file     Minutes per session: Not on file    Stress: Not on file   Relationships    Social connections:     Talks on phone: Not on file     Gets together: Not on file     Attends Alevism service: Not on file     Active member of club or organization: Not on file     Attends meetings  of clubs or organizations: Not on file     Relationship status: Not on file   Other Topics Concern    Not on file   Social History Narrative    Not on file     Outpatient Encounter Medications as of 6/19/2019   Medication Sig Dispense Refill    aspirin (ECOTRIN) 81 MG EC tablet Take 81 mg by mouth.      candesartan-hydrochlorothiazide (ATACAND HCT) 16-12.5 mg per tablet Take 1 tablet by mouth once daily. 90 tablet 3    nitroGLYCERIN (NITROSTAT) 0.4 MG SL tablet Place 1 tablet (0.4 mg total) under the tongue every 5 (five) minutes as needed. 30 tablet 0    omega 3-dha-epa-fish oil 900-1,400 mg CpDR Take by mouth once daily.       fexofenadine (ALLEGRA) 180 MG tablet Take 1 tablet (180 mg total) by mouth once daily. 30 tablet 0    zolpidem (AMBIEN) 10 mg Tab Take 1 tablet (10 mg total) by mouth nightly as needed. 30 tablet 2    [DISCONTINUED] zolpidem (AMBIEN) 10 mg Tab Take 1 tablet (10 mg total) by mouth nightly as needed. 30 tablet 0     No facility-administered encounter medications on file as of 6/19/2019.        Review of Systems   Constitutional: Negative for appetite change and fever.   HENT: Negative for congestion, ear discharge, facial swelling, sinus pressure, sneezing, sore throat and voice change.    Eyes: Negative for discharge and itching.   Respiratory: Negative for cough, chest tightness and wheezing.    Cardiovascular: Negative.  Negative for chest pain and leg swelling.   Gastrointestinal: Negative for abdominal pain, nausea and vomiting.   Endocrine: Negative for cold intolerance and heat intolerance.   Genitourinary: Negative for dysuria and flank pain.   Musculoskeletal: Negative for myalgias and neck stiffness.   Skin: Negative for pallor and rash.   Neurological: Negative for facial asymmetry and weakness.   Psychiatric/Behavioral: Negative for agitation and confusion.       Objective:      /78 (BP Location: Right arm, Patient Position: Sitting, BP Method: Large (Manual))    "Pulse 78   Temp 98.1 °F (36.7 °C) (Oral)   Ht 5' 10" (1.778 m)   Wt 94.8 kg (209 lb 1.7 oz)   SpO2 95%   BMI 30.00 kg/m²   Physical Exam   Constitutional: He is oriented to person, place, and time. He appears well-developed. No distress.   HENT:   Head: Normocephalic and atraumatic.   Right Ear: Hearing, tympanic membrane and external ear normal. No drainage, swelling or tenderness. No decreased hearing is noted.   Left Ear: Tympanic membrane and external ear normal. No drainage, swelling or tenderness. Decreased hearing is noted.   Eyes: Conjunctivae and EOM are normal.   Neck: Neck supple.   Cardiovascular: Normal rate and regular rhythm.   Pulmonary/Chest: Effort normal. No respiratory distress.   Abdominal: Soft. Normal appearance and bowel sounds are normal. There is no hepatosplenomegaly.   Genitourinary:   Genitourinary Comments: deferred   Musculoskeletal: He exhibits no edema.   Neurological: He is alert and oriented to person, place, and time.   Skin: Skin is warm and dry.   Psychiatric: He has a normal mood and affect. His behavior is normal.   Nursing note and vitals reviewed.        Assessment:       1. Dysfunction of left eustachian tube    2. ELI (obstructive sleep apnea)    3. Other insomnia        Plan:   Dysfunction of left eustachian tube  Trial of decongestant and if it is persistent, then to audiology  -     Ambulatory referral to Audiology    ELI (obstructive sleep apnea): suspected, sleep study for further evaluation  -     Home Sleep Studies; Future    Other insomnia: chronic stable  -     zolpidem (AMBIEN) 10 mg Tab; Take 1 tablet (10 mg total) by mouth nightly as needed.  Dispense: 30 tablet; Refill: 2      Total time spent in face to face counseling and coordination of care - 25 minutes over 50% of time was used in discussion of prognosis, risks, benefits of treatment, instructions and compliance with regimen .      "

## 2019-06-24 ENCOUNTER — OFFICE VISIT (OUTPATIENT)
Dept: OTOLARYNGOLOGY | Facility: CLINIC | Age: 49
End: 2019-06-24
Payer: COMMERCIAL

## 2019-06-24 ENCOUNTER — CLINICAL SUPPORT (OUTPATIENT)
Dept: AUDIOLOGY | Facility: CLINIC | Age: 49
End: 2019-06-24
Payer: COMMERCIAL

## 2019-06-24 VITALS — HEIGHT: 70 IN | TEMPERATURE: 98 F | WEIGHT: 211 LBS | BODY MASS INDEX: 30.21 KG/M2

## 2019-06-24 DIAGNOSIS — H69.90 DYSFUNCTION OF EUSTACHIAN TUBE, UNSPECIFIED LATERALITY: ICD-10-CM

## 2019-06-24 DIAGNOSIS — H69.90 DYSFUNCTION OF EUSTACHIAN TUBE, UNSPECIFIED LATERALITY: Primary | ICD-10-CM

## 2019-06-24 DIAGNOSIS — H90.3 SENSORINEURAL HEARING LOSS, BILATERAL: Primary | ICD-10-CM

## 2019-06-24 PROCEDURE — 92567 PR TYMPA2METRY: ICD-10-PCS | Mod: S$GLB,,, | Performed by: AUDIOLOGIST-HEARING AID FITTER

## 2019-06-24 PROCEDURE — 99203 OFFICE O/P NEW LOW 30 MIN: CPT | Mod: S$GLB,,, | Performed by: PHYSICIAN ASSISTANT

## 2019-06-24 PROCEDURE — 92557 PR COMPREHENSIVE HEARING TEST: ICD-10-PCS | Mod: S$GLB,,, | Performed by: AUDIOLOGIST-HEARING AID FITTER

## 2019-06-24 PROCEDURE — 3008F PR BODY MASS INDEX (BMI) DOCUMENTED: ICD-10-PCS | Mod: CPTII,S$GLB,, | Performed by: PHYSICIAN ASSISTANT

## 2019-06-24 PROCEDURE — 99999 PR PBB SHADOW E&M-EST. PATIENT-LVL III: ICD-10-PCS | Mod: PBBFAC,,, | Performed by: PHYSICIAN ASSISTANT

## 2019-06-24 PROCEDURE — 99203 PR OFFICE/OUTPT VISIT, NEW, LEVL III, 30-44 MIN: ICD-10-PCS | Mod: S$GLB,,, | Performed by: PHYSICIAN ASSISTANT

## 2019-06-24 PROCEDURE — 92567 TYMPANOMETRY: CPT | Mod: S$GLB,,, | Performed by: AUDIOLOGIST-HEARING AID FITTER

## 2019-06-24 PROCEDURE — 3008F BODY MASS INDEX DOCD: CPT | Mod: CPTII,S$GLB,, | Performed by: PHYSICIAN ASSISTANT

## 2019-06-24 PROCEDURE — 99999 PR PBB SHADOW E&M-EST. PATIENT-LVL III: CPT | Mod: PBBFAC,,, | Performed by: PHYSICIAN ASSISTANT

## 2019-06-24 PROCEDURE — 92557 COMPREHENSIVE HEARING TEST: CPT | Mod: S$GLB,,, | Performed by: AUDIOLOGIST-HEARING AID FITTER

## 2019-06-24 NOTE — LETTER
June 24, 2019      Bozena Guzman MD  [id number] [address] [address]           [facility]  [address] [place] [id number]  Phone: [phone]  Fax: [phone]          Patient: Nehemias Zhang   MR Number: [id number]   YOB: 1970   Date of Visit: 6/24/2019       Dear Dr. Bozena Guzman:    Thank you for referring Nehemias Zhang to me for evaluation. Attached you will find relevant portions of my assessment and plan of care.    If you have questions, please do not hesitate to call me. I look forward to following Nehemias Zhang along with you.    Sincerely,    Talia Delgado PA-C    Enclosure  CC:  No Recipients    If you would like to receive this communication electronically, please contact [email or web address] or [phone] to request more information on [employer] Link access.    For providers and/or their staff who would like to refer a patient to [facility], please contact us through our one-stop-shop provider referral line, [facility], at [phone].    If you feel you have received this communication in error or would no longer like to receive these types of communications, please e-mail [email or web address]

## 2019-06-24 NOTE — PROGRESS NOTES
Subjective:       Patient ID: Nehemias Zhang is a 48 y.o. male.    Chief Complaint: Consult (left ear)    Mr. Zhang is a very pleasant 49 yo male here to see me today with complaints of bilateral ear fullness/congestion. He notice it after cutting the grass a few week ago. Denies any hearing loss.      Review of Systems   Constitutional: Negative for fatigue, fever and unexpected weight change.   HENT: Negative for congestion, ear discharge, ear pain (ear fullness), facial swelling, hearing loss, nosebleeds, postnasal drip, rhinorrhea, sinus pressure, sneezing, sore throat, tinnitus, trouble swallowing and voice change.    Eyes: Negative for discharge, redness and itching.   Respiratory: Negative for cough, choking, shortness of breath and wheezing.    Cardiovascular: Negative for chest pain and palpitations.   Gastrointestinal: Negative for abdominal pain.        No reflux.   Musculoskeletal: Negative for neck pain.   Neurological: Negative for dizziness, facial asymmetry, light-headedness and headaches.   Hematological: Negative for adenopathy. Does not bruise/bleed easily.   Psychiatric/Behavioral: Negative for agitation, behavioral problems, confusion and decreased concentration.       Objective:      Physical Exam   Constitutional: He is oriented to person, place, and time. Vital signs are normal. He appears well-developed and well-nourished. No distress.   HENT:   Head: Normocephalic and atraumatic.   Right Ear: Hearing, external ear and ear canal normal. Tympanic membrane is retracted.   Left Ear: Hearing, tympanic membrane, external ear and ear canal normal.   Nose: Nose normal. No mucosal edema, rhinorrhea, nasal deformity or septal deviation.   Mouth/Throat: Uvula is midline, oropharynx is clear and moist and mucous membranes are normal. No trismus in the jaw. Normal dentition. No uvula swelling. No oropharyngeal exudate or posterior oropharyngeal edema.   Eyes: Pupils are equal, round, and reactive to  light. Conjunctivae and EOM are normal. Right eye exhibits no chemosis. Left eye exhibits no chemosis. Right conjunctiva is not injected. Left conjunctiva is not injected. No scleral icterus.   Neck: Trachea normal and phonation normal. No tracheal tenderness present. No tracheal deviation present. No thyroid mass and no thyromegaly present.   Cardiovascular: Intact distal pulses.   Pulmonary/Chest: Effort normal. No accessory muscle usage or stridor. No respiratory distress.   Lymphadenopathy:        Head (right side): No submental, no submandibular, no preauricular and no posterior auricular adenopathy present.        Head (left side): No submental, no submandibular, no preauricular and no posterior auricular adenopathy present.     He has no cervical adenopathy.        Right cervical: No superficial cervical and no deep cervical adenopathy present.       Left cervical: No superficial cervical and no deep cervical adenopathy present.   Neurological: He is alert and oriented to person, place, and time. No cranial nerve deficit.   Skin: Skin is warm and dry. No rash noted. No erythema.   Psychiatric: He has a normal mood and affect. His behavior is normal. Thought content normal.       Assessment:       1. Dysfunction of Eustachian tube, unspecified laterality        Plan:       We had a long discussion regarding the anatomy and function of the eustachian tube.  We discussed that the eustachian tube acts as a pump to keep the appropriate amount of pressure behind the ear drum.  I gave the patient a prescription for a nasal steroid spray to be used on a daily basis, and we discussed that it will take 2-3 weeks of daily use to achieve maximal effectiveness.

## 2019-06-24 NOTE — PROGRESS NOTES
Referring provider: Dr. Megan Del Cid Bryant Zhang was seen 06/24/2019 for an audiological evaluation.  Patient complains of bilateral ear fullness/congestion. He notice it after cutting the grass a few week ago. Denies any hearing loss, tinnitus or dizziness.  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     Results reveal a normal-to-moderate sensorineural hearing loss 250-8000 Hz for the right ear, and a normal-to-moderate sensorineural hearing loss 250-8000 Hz for the left ear.   Speech Reception Thresholds were 25 dBHL for the right ear and 25 dBHL for the left ear.   Word recognition scores were excellent for the right ear and excellent for the left ear.   Tympanograms were Type A for the right ear and Type A for the left ear.    Patient was counseled on the above findings.    Recommendations:  1. ENT due to suspect ETD  2. Annual audiogram  3. Hearing aids, if and when motivated

## 2019-06-24 NOTE — Clinical Note
Thank you for this referral.  Audiology report for your review.  Patient followed with ENT at completion of audio visit.

## 2019-07-01 ENCOUNTER — PROCEDURE VISIT (OUTPATIENT)
Dept: SLEEP MEDICINE | Facility: CLINIC | Age: 49
End: 2019-07-01
Payer: COMMERCIAL

## 2019-07-01 ENCOUNTER — PATIENT MESSAGE (OUTPATIENT)
Dept: FAMILY MEDICINE | Facility: CLINIC | Age: 49
End: 2019-07-01

## 2019-07-01 ENCOUNTER — TELEPHONE (OUTPATIENT)
Dept: FAMILY MEDICINE | Facility: CLINIC | Age: 49
End: 2019-07-01

## 2019-07-01 DIAGNOSIS — G47.33 OSA (OBSTRUCTIVE SLEEP APNEA): Primary | ICD-10-CM

## 2019-07-01 PROCEDURE — 95800 SLP STDY UNATTENDED: CPT

## 2019-07-01 PROCEDURE — 95800 PR SLEEP STUDY, UNATTENDED, RECORD HEART RATE/O2 SAT/RESP ANAL/SLEEP TIME: ICD-10-PCS | Mod: 26,,, | Performed by: INTERNAL MEDICINE

## 2019-07-01 PROCEDURE — 99499 NO LOS: ICD-10-PCS | Mod: S$GLB,,, | Performed by: INTERNAL MEDICINE

## 2019-07-01 PROCEDURE — 95800 SLP STDY UNATTENDED: CPT | Mod: 26,,, | Performed by: INTERNAL MEDICINE

## 2019-07-01 PROCEDURE — 99499 UNLISTED E&M SERVICE: CPT | Mod: S$GLB,,, | Performed by: INTERNAL MEDICINE

## 2019-07-01 NOTE — TELEPHONE ENCOUNTER
----- Message from Tamara García sent at 7/1/2019  8:29 AM CDT -----  Contact: self  Type:  Needs Medical Advice    Who Called: pt  Symptoms (please be specific):n/a   How long has patient had these symptoms:n/a  Pharmacy name and phone #: n/a  Would the patient rather a call back or a response via [facility]? Call back  Best Call Back Number: [phone]  Additional Information: pt did sleep study last week and does not know why he had another one scheduled for today,.    Thanks,  Tamara García

## 2019-07-01 NOTE — Clinical Note
PHYSICIAN INTERPRETATION AND COMMENTS: Findings are consistent with severe, positional obstructive sleepapnea (ELI). Overall AHI was 38.0 events per hour. oxygen saturation chucky was 85.4%. Total sleep time was 6.3 hr. Pleaserefer patient to Sleep Disorder Clinic for prompt intervention. CPAP is indicated for treatment of obstructive apnea. Closefollow-up to ensure resolution of symptoms.CLINICAL HISTORY: 48 year old male presented with: Heavy snoring, daytime sleepiness, witnessed apnea with daytimedysfunction. Patient also has comorbid insomnia on Ambien 10 mg. 17 inch neck, BMI of 30, an [place] sleepiness score of 1,history of hypertension and symptoms of nocturnal snoring, waking up choking and witnessed apneas. Based on the clinicalhistory, the patient has a high pre-test probability of having severe ELI.SLEEP STUDY FINDINGS: Patient underwent a one night Home Sleep Test and by behavioral criteria, slept forapproximately 6.3 hours, with a sleep latency of 7 minutes and a sleep efficien

## 2019-07-01 NOTE — PROGRESS NOTES
PHYSICIAN INTERPRETATION AND COMMENTS: Findings are consistent with severe, positional obstructive sleep  apnea (ELI). Overall AHI was 38.0 events per hour. oxygen saturation chucky was 85.4%. Total sleep time was 6.3 hr. Please  refer patient to Sleep Disorder Clinic for prompt intervention. CPAP is indicated for treatment of obstructive apnea. Close  follow-up to ensure resolution of symptoms.  CLINICAL HISTORY: 48 year old male presented with: Heavy snoring, daytime sleepiness, witnessed apnea with daytime  dysfunction. Patient also has comorbid insomnia on Ambien 10 mg. 17 inch neck, BMI of 30, an [place] sleepiness score of 1,  history of hypertension and symptoms of nocturnal snoring, waking up choking and witnessed apneas. Based on the clinical  history, the patient has a high pre-test probability of having severe ELI.  SLEEP STUDY FINDINGS: Patient underwent a one night Home Sleep Test and by behavioral criteria, slept for  approximately 6.3 hours, with a sleep latency of 7 minutes and a sleep efficiency of 90.2%. Severe sleep disordered breathing  (AHI=38) is noted based on a 4% hypopnea desaturation criteria, predominantly in the supine position (52 events/hour). The  patient slept supine 65.1% of the night based on valid recording time of 6.33 hours and is 4.3 times as likely to have  apneas/hypopneas when supine. The apneas/hypopneas are accompanied by minimal oxygen desaturation (percent time below  90% SpO2: 1.2%, Min SpO2: 85.4%). The average desaturation across all sleep disordered breathing events is 4.4%. Snoring  occurs for 46.8% (30 dB) of the study, 34.4% is very loud. The mean pulse rate is 72 BPM, with very frequent pulse rate  variability (64 events with >= 6 BPM increase/decrease per hour).  TREATMENT CONSIDERATIONS: Consider nasal continuous positive airway pressure (CPAP/AutoPAP) as the initial  treatment choice for severe obstructive sleep apnea. A mandibular advancement splint (MAS) or  referral to an ENT surgeon  for modification to the airway should be considered to reduce the risk of mortality caused by severe ELI if the patient prefers  an alternative therapy or the CPAP trial is unsuccessful. A Mandibular Advancement Splint (MAS) will likely provide treatment  benefit independent of ELI severity. The patient should avoid sleeping supine; the non-supine RDI is 2.4 times less severe than  the supine RDI.  DISEASE MANAGEMENT CONSIDERATIONS: Insomnia is a symptom that can be associated with untreated ELI.  Sleeping pills may increase the severity of untreated ELI and their use should be reevaluated once the ELI is treated.

## 2019-07-01 NOTE — TELEPHONE ENCOUNTER
Pt received an alert from his [facility] at 7:30 am to remind him of his 8:00am appt for a sleep study today. Pt states that he had a sleep study done last week and wants to know why he had an appt for another study today. Please advise.

## 2019-07-01 NOTE — PROCEDURES
Home Sleep Studies  Date/Time: 7/1/2019 5:49 PM  Performed by: Jeremie Key MD  Authorized by: Bozena Guzman MD       PHYSICIAN INTERPRETATION AND COMMENTS: Findings are consistent with severe, positional obstructive sleep  apnea (ELI). Overall AHI was 38.0 events per hour. oxygen saturation chucky was 85.4%. Total sleep time was 6.3 hr. Please  refer patient to Sleep Disorder Clinic for prompt intervention. CPAP is indicated for treatment of obstructive apnea. Close  follow-up to ensure resolution of symptoms.  CLINICAL HISTORY: 48 year old male presented with: Heavy snoring, daytime sleepiness, witnessed apnea with daytime  dysfunction. Patient also has comorbid insomnia on Ambien 10 mg. 17 inch neck, BMI of 30, an [place] sleepiness score of 1,  history of hypertension and symptoms of nocturnal snoring, waking up choking and witnessed apneas. Based on the clinical  history, the patient has a high pre-test probability of having severe ELI.  SLEEP STUDY FINDINGS: Patient underwent a one night Home Sleep Test and by behavioral criteria, slept for  approximately 6.3 hours, with a sleep latency of 7 minutes and a sleep efficiency of 90.2%. Severe sleep disordered breathing  (AHI=38) is noted based on a 4% hypopnea desaturation criteria, predominantly in the supine position (52 events/hour). The  patient slept supine 65.1% of the night based on valid recording time of 6.33 hours and is 4.3 times as likely to have  apneas/hypopneas when supine. The apneas/hypopneas are accompanied by minimal oxygen desaturation (percent time below  90% SpO2: 1.2%, Min SpO2: 85.4%). The average desaturation across all sleep disordered breathing events is 4.4%. Snoring  occurs for 46.8% (30 dB) of the study, 34.4% is very loud. The mean pulse rate is 72 BPM, with very frequent pulse rate  variability (64 events with >= 6 BPM increase/decrease per hour).  TREATMENT CONSIDERATIONS: Consider nasal continuous positive airway pressure  (CPAP/AutoPAP) as the initial  treatment choice for severe obstructive sleep apnea. A mandibular advancement splint (MAS) or referral to an ENT surgeon  for modification to the airway should be considered to reduce the risk of mortality caused by severe ELI if the patient prefers  an alternative therapy or the CPAP trial is unsuccessful. A Mandibular Advancement Splint (MAS) will likely provide treatment  benefit independent of ELI severity. The patient should avoid sleeping supine; the non-supine RDI is 2.4 times less severe than  the supine RDI.  DISEASE MANAGEMENT CONSIDERATIONS: Insomnia is a symptom that can be associated with untreated ELI.  Sleeping pills may increase the severity of untreated ELI and their use should be reevaluated once the ELI is treated.

## 2019-07-22 ENCOUNTER — OFFICE VISIT (OUTPATIENT)
Dept: PULMONOLOGY | Facility: CLINIC | Age: 49
End: 2019-07-22
Payer: COMMERCIAL

## 2019-07-22 VITALS
HEIGHT: 70 IN | RESPIRATION RATE: 16 BRPM | DIASTOLIC BLOOD PRESSURE: 70 MMHG | BODY MASS INDEX: 30.38 KG/M2 | OXYGEN SATURATION: 98 % | WEIGHT: 212.19 LBS | SYSTOLIC BLOOD PRESSURE: 114 MMHG | HEART RATE: 80 BPM

## 2019-07-22 DIAGNOSIS — F51.01 PRIMARY INSOMNIA: Primary | ICD-10-CM

## 2019-07-22 DIAGNOSIS — G47.33 OSA (OBSTRUCTIVE SLEEP APNEA): ICD-10-CM

## 2019-07-22 PROCEDURE — 99205 PR OFFICE/OUTPT VISIT, NEW, LEVL V, 60-74 MIN: ICD-10-PCS | Mod: S$GLB,,, | Performed by: INTERNAL MEDICINE

## 2019-07-22 PROCEDURE — 3078F PR MOST RECENT DIASTOLIC BLOOD PRESSURE < 80 MM HG: ICD-10-PCS | Mod: CPTII,S$GLB,, | Performed by: INTERNAL MEDICINE

## 2019-07-22 PROCEDURE — 3008F PR BODY MASS INDEX (BMI) DOCUMENTED: ICD-10-PCS | Mod: CPTII,S$GLB,, | Performed by: INTERNAL MEDICINE

## 2019-07-22 PROCEDURE — 3074F SYST BP LT 130 MM HG: CPT | Mod: CPTII,S$GLB,, | Performed by: INTERNAL MEDICINE

## 2019-07-22 PROCEDURE — 99205 OFFICE O/P NEW HI 60 MIN: CPT | Mod: S$GLB,,, | Performed by: INTERNAL MEDICINE

## 2019-07-22 PROCEDURE — 3008F BODY MASS INDEX DOCD: CPT | Mod: CPTII,S$GLB,, | Performed by: INTERNAL MEDICINE

## 2019-07-22 PROCEDURE — 99999 PR PBB SHADOW E&M-EST. PATIENT-LVL III: CPT | Mod: PBBFAC,,, | Performed by: INTERNAL MEDICINE

## 2019-07-22 PROCEDURE — 99999 PR PBB SHADOW E&M-EST. PATIENT-LVL III: ICD-10-PCS | Mod: PBBFAC,,, | Performed by: INTERNAL MEDICINE

## 2019-07-22 PROCEDURE — 3078F DIAST BP <80 MM HG: CPT | Mod: CPTII,S$GLB,, | Performed by: INTERNAL MEDICINE

## 2019-07-22 PROCEDURE — 3074F PR MOST RECENT SYSTOLIC BLOOD PRESSURE < 130 MM HG: ICD-10-PCS | Mod: CPTII,S$GLB,, | Performed by: INTERNAL MEDICINE

## 2019-07-22 NOTE — LETTER
July 22, 2019      Bozena Guzman MD  [address] [address] [address]           [facility]  [address] [place] [id number]  Phone: [phone]  Fax: [phone]          Patient: Nehemias Zhang   MR Number: [id number]   YOB: 1970   Date of Visit: 7/22/2019       Dear Dr. Bozena Guzman:    Thank you for referring Nehemias Zhang to me for evaluation. Attached you will find relevant portions of my assessment and plan of care.    If you have questions, please do not hesitate to call me. I look forward to following Nehemias Zhang along with you.    Sincerely,    Mele Garcia MD    Enclosure  CC:  No Recipients    If you would like to receive this communication electronically, please contact [email or web address] or [phone] to request more information on [employer] Link access.    For providers and/or their staff who would like to refer a patient to [facility], please contact us through our one-stop-shop provider referral line, [facility], at [phone].    If you feel you have received this communication in error or would no longer like to receive these types of communications, please e-mail [email or web address]

## 2019-07-22 NOTE — PATIENT INSTRUCTIONS
CPAP HABITUATION PROCEDURE    Yogi Conn, Ph.D., [facility] and Mele Garcia M.D.  [facility] Disorders Center, [facility]    Some people have difficulty adjusting to CPAP/BiPAP/AutoCPAP.  This is not unusual or hard to understand: Breathing with CPAP is different from ordinary breathing, and this difference is aversive to some. The problem can be overcome, however, and the benefits CPAP confers are certainly worth the effort.  Below, you will find a simple and gradual way to get used to CPAP before you try to use it all night, every night.  The essence of this procedure is to relax and let breathing with CPAP become a habit.  It may take about 2 weeks, and involves the followin. CPAP while awake and comfortably seated, during the late evening.     2. CPAP in bed while attempting sleep at night.     3. If your discomfort is too great at any time, discontinue and attempt again later the same night, for the same amount of time.   4. You and your physician may alter the times and pressures if necessary.     5. If you find that it is very easy to get used to CPAP, you may start using it every night when you are comfortable enough to do so.  6. IMPORTANT REMINDER: If you have a cold or sinus congestion it is okay to miss a night or two of CPAP. Consider using antihistamines or decongestants to clear up your sinus congestion prior to sleeping.    DAYS  1-3   Start CPAP while awake and comfortably seated during the late evening, after having prepared for bed.  You may do this while watching television, listening to music or reading. Use for 1 hour, then take off CPAP and go directly to bed to sleep    DAYS  4-6   Start CPAP when you go to bed and use for 1 hour, or until you fall asleep.  If your discomfort is too great at any time, discontinue and attempt again later the same night, for the same designated amount of time (1 hour).     DAYS  7-9   Increase time with CPAP to 2 hours a  night.  If your discomfort is too great at any time, discontinue and attempt again later the same night, for the same designated amount of time (2 hours).     DAYS 10-12  Increase time with CPAP to 3 hours a night. If your discomfort is too great at any time, discontinue and attempt again later the same night, for the same designated amount of time (3 hours).     DAYS 13-15   Sleep the entire night with CPAP.     OPTIONAL: You may use Progressive Muscle Relaxation (PMR) to help put you at ease when using CPAP; do PMR twice each day, once in the morning or afternoon, and once in the evening just before using CPAP. You may do PMR prior to any attempt until you are comfortable with CPAP.

## 2019-07-22 NOTE — PROGRESS NOTES
"Subjective:       Nehemias Zhang is a 48 y.o. male self-referred for a sleep evaluation. He complains of snorting, periods of not breathing, decreased concentration, difficulty falling asleep once awakened, sleep onset insomnia  .  Symptoms began 1 year ago, gradually worsening since that time.  He goes to sleep at 9 - 10 weekdays and  weekends. He awakens 6 weekdays and  weekends. He falls asleep in 45 minutes.  Collar size na. He denies knees buckling with laughing, completely or partially paralyzed while falling asleep or waking up. Previous evaluation and treatment has included PSG.  TAYLER    Nehemias Zhang is a 48 y.o. male who complains of insomnia. Onset was 20  years ago. Patient describes symptoms as early morning awakening, difficulty falling asleep and non-restful sleep. Patient has found no relief with avoiding long naps during the day, avoiding vigorous physical exercise prior to bed and melatonin use. Associated symptoms include: anxiety and fatigue. Patient denies frequent nighttime urination. Symptoms have stabilized. Ambien helps         Previous Report(s) Reviewed: historical medical records     The following portions of the patient's history were reviewed and updated as appropriate: allergies, current medications, past family history, past medical history, past social history, past surgical history and problem list.  Past Medical History:   Diagnosis Date    Hyperlipidemia     Hypertension     Insomnia      Past Surgical History:   Procedure Laterality Date    TONSILLECTOMY         Review of Systems  Pertinent items are noted in HPI.      Objective:      /70   Pulse 80   Resp 16   Ht 5' 10" (1.778 m)   Wt 96.2 kg (212 lb 3.1 oz)   SpO2 98%   BMI 30.45 kg/m²   General appearance: alert, appears stated age and cooperative  Head: Normocephalic, without obvious abnormality, atraumatic  Eyes: conjunctivae/corneas clear. PERRL, EOM's intact. Fundi benign.  Ears: normal TM's and " external ear canals both ears  Nose: Nares normal. Septum midline. Mucosa normal. No drainage or sinus tenderness.  Throat: lips, mucosa, and tongue normal; teeth and gums normal  Neck: no adenopathy, no carotid bruit, no JVD, supple, symmetrical, trachea midline and thyroid not enlarged, symmetric, no tenderness/mass/nodules  Lungs: clear to auscultation bilaterally  Heart: regular rate and rhythm, S1, S2 normal, no murmur, click, rub or gallop  Neurologic: Grossly normal      Home Sleep Studies  Date/Time: 7/1/2019 5:49 PM  Performed by: Jeremie Key MD  Authorized by: Bozena Guzman MD        PHYSICIAN INTERPRETATION AND COMMENTS: Findings are consistent with severe, positional obstructive sleep  apnea (ELI). Overall AHI was 38.0 events per hour. oxygen saturation chucky was 85.4%. Total sleep time was 6.3 hr. Please  refer patient to [facility] for prompt intervention. CPAP is indicated for treatment of obstructive apnea. Close  follow-up to ensure resolution of symptoms.  CLINICAL HISTORY: 48 year old male presented with: Heavy snoring, daytime sleepiness, witnessed apnea with daytime  dysfunction. Patient also has comorbid insomnia on Ambien 10 mg. 17 inch neck, BMI of 30, an [place] sleepiness score of 1,  history of hypertension and symptoms of nocturnal snoring, waking up choking and witnessed apneas. Based on the clinical  history, the patient has a high pre-test probability of having severe ELI.  SLEEP STUDY FINDINGS: Patient underwent a one night Home Sleep Test and by behavioral criteria, slept for  approximately 6.3 hours, with a sleep latency of 7 minutes and a sleep efficiency of 90.2%. Severe sleep disordered breathing  (AHI=38) is noted based on a 4% hypopnea desaturation criteria, predominantly in the supine position (52 events/hour). The  patient slept supine 65.1% of the night based on valid recording time of 6.33 hours and is 4.3 times as likely to have  apneas/hypopneas when  supine. The apneas/hypopneas are accompanied by minimal oxygen desaturation (percent time below  90% SpO2: 1.2%, Min SpO2: 85.4%). The average desaturation across all sleep disordered breathing events is 4.4%. Snoring  occurs for 46.8% (30 dB) of the study, 34.4% is very loud. The mean pulse rate is 72 BPM, with very frequent pulse rate  variability (64 events with >= 6 BPM increase/decrease per hour).  TREATMENT CONSIDERATIONS: Consider nasal continuous positive airway pressure (CPAP/AutoPAP) as the initial  treatment choice for severe obstructive sleep apnea. A mandibular advancement splint (MAS) or referral to an ENT surgeon  for modification to the airway should be considered to reduce the risk of mortality caused by severe ELI if the patient prefers  an alternative therapy or the CPAP trial is unsuccessful. A Mandibular Advancement Splint (MAS) will likely provide treatment  benefit independent of ELI severity. The patient should avoid sleeping supine; the non-supine RDI is 2.4 times less severe than  the supine RDI.  DISEASE MANAGEMENT CONSIDERATIONS: Insomnia is a symptom that can be associated with untreated ELI.  Sleeping pills may increase the severity of untreated ELI and their use should be reevaluated once the ELI is treated.      Progress Notes   Jeremie eKy MD (Physician)   Pulmonary Disease      PHYSICIAN INTERPRETATION AND COMMENTS: Findings are consistent with severe, positional obstructive sleep  apnea (ELI). Overall AHI was 38.0 events per hour. oxygen saturation chucky was 85.4%. Total sleep time was 6.3 hr. Please  refer patient to Sleep Disorder Clinic for prompt intervention. CPAP is indicated for treatment of obstructive apnea. Close  follow-up to ensure resolution of symptoms.  CLINICAL HISTORY: 48 year old male presented with: Heavy snoring, daytime sleepiness, witnessed apnea with daytime  dysfunction. Patient also has comorbid insomnia on Ambien 10 mg. 17 inch neck, BMI of 30,  an [place] sleepiness score of 1,  history of hypertension and symptoms of nocturnal snoring, waking up choking and witnessed apneas. Based on the clinical  history, the patient has a high pre-test probability of having severe ELI.  SLEEP STUDY FINDINGS: Patient underwent a one night Home Sleep Test and by behavioral criteria, slept for  approximately 6.3 hours, with a sleep latency of 7 minutes and a sleep efficiency of 90.2%. Severe sleep disordered breathing  (AHI=38) is noted based on a 4% hypopnea desaturation criteria, predominantly in the supine position (52 events/hour). The  patient slept supine 65.1% of the night based on valid recording time of 6.33 hours and is 4.3 times as likely to have  apneas/hypopneas when supine. The apneas/hypopneas are accompanied by minimal oxygen desaturation (percent time below  90% SpO2: 1.2%, Min SpO2: 85.4%). The average desaturation across all sleep disordered breathing events is 4.4%. Snoring  occurs for 46.8% (30 dB) of the study, 34.4% is very loud. The mean pulse rate is 72 BPM, with very frequent pulse rate  variability (64 events with >= 6 BPM increase/decrease per hour).  TREATMENT CONSIDERATIONS: Consider nasal continuous positive airway pressure (CPAP/AutoPAP) as the initial  treatment choice for severe obstructive sleep apnea. A mandibular advancement splint (MAS) or referral to an ENT surgeon  for modification to the airway should be considered to reduce the risk of mortality caused by severe ELI if the patient prefers  an alternative therapy or the CPAP trial is unsuccessful. A Mandibular Advancement Splint (MAS) will likely provide treatment  benefit independent of ELI severity. The patient should avoid sleeping supine; the non-supine RDI is 2.4 times less severe than  the supine RDI.  DISEASE MANAGEMENT CONSIDERATIONS: Insomnia is a symptom that can be associated with untreated ELI.  Sleeping pills may increase the severity of untreated ELI and their use  should be reevaluated once the ELI is treated.      Instructions      After Visit Summary (Printed 7/1/2019)         Assessment:      obstructive sleep apnea           1. Primary insomnia    2. ELI (obstructive sleep apnea)        Outpatient Encounter Medications as of 7/22/2019   Medication Sig Dispense Refill    aspirin (ECOTRIN) 81 MG EC tablet Take 81 mg by mouth.      candesartan-hydrochlorothiazide (ATACAND HCT) 16-12.5 mg per tablet Take 1 tablet by mouth once daily. 90 tablet 3    nitroGLYCERIN (NITROSTAT) 0.4 MG SL tablet Place 1 tablet (0.4 mg total) under the tongue every 5 (five) minutes as needed. 30 tablet 0    omega 3-dha-epa-fish oil 900-1,400 mg CpDR Take by mouth once daily.       fexofenadine (ALLEGRA) 180 MG tablet Take 1 tablet (180 mg total) by mouth once daily. 30 tablet 0    zolpidem (AMBIEN) 10 mg Tab Take 1 tablet (10 mg total) by mouth nightly as needed. 30 tablet 2     No facility-administered encounter medications on file as of 7/22/2019.      Orders Placed This Encounter   Procedures    CPAP FOR HOME USE     [facility] for CPAP/Oxygen/Nebulizer supplies.  Customer Service: [phone]  Call: [phone]  Fax: [phone]  Billing Inquiries: [phone] or [phone]     Order Specific Question:   Type:     Answer:   Auto CPAP     Order Specific Question:   Auto CPAP pressure setting range (cmH20):     Answer:   6- 20     Order Specific Question:   Length of need (1-99 months):     Answer:   99     Order Specific Question:   Humidification:     Answer:   Heated     Order Specific Question:   Type of mask:     Answer:   FFM     Order Specific Question:   Headgear?     Answer:   Yes     Order Specific Question:   Tubing?     Answer:   Yes     Order Specific Question:   Humidifier chamber?     Answer:   Yes     Order Specific Question:   Chin strap?     Answer:   Yes     Order Specific Question:   Filters?     Answer:   Yes     Order Specific Question:   Cushions?      Answer:   Yes    CPAP/BIPAP SUPPLIES     [facility].  Customer Service: [phone]  Call: [phone]  Fax: [phone]  Billing Inquiries: [phone] or [phone]     Order Specific Question:   Type of mask:     Answer:   FFM     Order Specific Question:   Headgear?     Answer:   Yes     Order Specific Question:   Tubing?     Answer:   Yes     Order Specific Question:   Humidifier chamber?     Answer:   Yes     Order Specific Question:   Chin strap?     Answer:   Yes     Order Specific Question:   Filters?     Answer:   Yes     Order Specific Question:   Cushions?     Answer:   Yes     Order Specific Question:   Length of need (1-99 months):     Answer:   99     Plan:      Follow-up in 6 weeks, sooner should new symptoms or problems arise.        Follow up in about 6 weeks (around 9/2/2019) for CPAP initial download - bring CPAP machine.    Schedule follow up one week after completion of sleep study.

## 2019-08-05 ENCOUNTER — PATIENT MESSAGE (OUTPATIENT)
Dept: PULMONOLOGY | Facility: CLINIC | Age: 49
End: 2019-08-05

## 2019-08-05 NOTE — TELEPHONE ENCOUNTER
Spoke with patient, says that he was told that he was waiting on approval from his insurance to get CPAP but hasn't heard anything back. Pt was told that I would look into it and give patient a call back once I get some answers. Pt voiced understanding.

## 2019-09-10 ENCOUNTER — OFFICE VISIT (OUTPATIENT)
Dept: PULMONOLOGY | Facility: CLINIC | Age: 49
End: 2019-09-10
Payer: COMMERCIAL

## 2019-09-10 VITALS
SYSTOLIC BLOOD PRESSURE: 112 MMHG | BODY MASS INDEX: 31.12 KG/M2 | RESPIRATION RATE: 18 BRPM | WEIGHT: 217.38 LBS | DIASTOLIC BLOOD PRESSURE: 70 MMHG | OXYGEN SATURATION: 97 % | HEART RATE: 87 BPM | HEIGHT: 70 IN

## 2019-09-10 DIAGNOSIS — G47.33 OSA ON CPAP: Primary | ICD-10-CM

## 2019-09-10 DIAGNOSIS — G47.33 OSA (OBSTRUCTIVE SLEEP APNEA): ICD-10-CM

## 2019-09-10 PROCEDURE — 3074F PR MOST RECENT SYSTOLIC BLOOD PRESSURE < 130 MM HG: ICD-10-PCS | Mod: CPTII,S$GLB,, | Performed by: INTERNAL MEDICINE

## 2019-09-10 PROCEDURE — 3078F DIAST BP <80 MM HG: CPT | Mod: CPTII,S$GLB,, | Performed by: INTERNAL MEDICINE

## 2019-09-10 PROCEDURE — 3008F PR BODY MASS INDEX (BMI) DOCUMENTED: ICD-10-PCS | Mod: CPTII,S$GLB,, | Performed by: INTERNAL MEDICINE

## 2019-09-10 PROCEDURE — 3008F BODY MASS INDEX DOCD: CPT | Mod: CPTII,S$GLB,, | Performed by: INTERNAL MEDICINE

## 2019-09-10 PROCEDURE — 99999 PR PBB SHADOW E&M-EST. PATIENT-LVL III: ICD-10-PCS | Mod: PBBFAC,,, | Performed by: INTERNAL MEDICINE

## 2019-09-10 PROCEDURE — 99215 PR OFFICE/OUTPT VISIT, EST, LEVL V, 40-54 MIN: ICD-10-PCS | Mod: S$GLB,,, | Performed by: INTERNAL MEDICINE

## 2019-09-10 PROCEDURE — 3078F PR MOST RECENT DIASTOLIC BLOOD PRESSURE < 80 MM HG: ICD-10-PCS | Mod: CPTII,S$GLB,, | Performed by: INTERNAL MEDICINE

## 2019-09-10 PROCEDURE — 3074F SYST BP LT 130 MM HG: CPT | Mod: CPTII,S$GLB,, | Performed by: INTERNAL MEDICINE

## 2019-09-10 PROCEDURE — 99215 OFFICE O/P EST HI 40 MIN: CPT | Mod: S$GLB,,, | Performed by: INTERNAL MEDICINE

## 2019-09-10 PROCEDURE — 99999 PR PBB SHADOW E&M-EST. PATIENT-LVL III: CPT | Mod: PBBFAC,,, | Performed by: INTERNAL MEDICINE

## 2019-09-10 NOTE — PROGRESS NOTES
"Subjective:       Nehemias Zhang is a 49 y.o. male self-referred for a sleep evaluation. He complains of snorting, periods of not breathing, decreased concentration, difficulty falling asleep once awakened, sleep onset insomnia  .  Symptoms began 1 year ago, gradually worsening since that time.  He goes to sleep at 9 - 10 weekdays and  weekends. He awakens 6 weekdays and  weekends. He falls asleep in 45 minutes.  Collar size na. He denies knees buckling with laughing, completely or partially paralyzed while falling asleep or waking up. Previous evaluation and treatment has included PSG.  [place]    Nehemias Zhang is a 48 y.o. male who complains of insomnia. Onset was 20  years ago. Patient describes symptoms as early morning awakening, difficulty falling asleep and non-restful sleep. Patient has found no relief with avoiding long naps during the day, avoiding vigorous physical exercise prior to bed and melatonin use. Associated symptoms include: anxiety and fatigue. Patient denies frequent nighttime urination. Symptoms have stabilized. Ambien helps    Previous Report(s) Reviewed: historical medical records     The following portions of the patient's history were reviewed and updated as appropriate: allergies, current medications, past family history, past medical history, past social history, past surgical history and problem list.  Past Medical History:   Diagnosis Date    Hyperlipidemia     Hypertension     Insomnia      Past Surgical History:   Procedure Laterality Date    TONSILLECTOMY         Review of Systems  Pertinent items are noted in HPI.      Objective:      /70   Pulse 87   Resp 18   Ht 5' 10" (1.778 m)   Wt 98.6 kg (217 lb 6 oz)   SpO2 97%   BMI 31.19 kg/m²   General appearance: alert, appears stated age and cooperative  Head: Normocephalic, without obvious abnormality, atraumatic  Eyes: conjunctivae/corneas clear. PERRL, EOM's intact. Fundi benign.  Ears: normal TM's and external " ear canals both ears  Nose: Nares normal. Septum midline. Mucosa normal. No drainage or sinus tenderness.  Throat: lips, mucosa, and tongue normal; teeth and gums normal  Neck: no adenopathy, no carotid bruit, no JVD, supple, symmetrical, trachea midline and thyroid not enlarged, symmetric, no tenderness/mass/nodules  Lungs: clear to auscultation bilaterally  Heart: regular rate and rhythm, S1, S2 normal, no murmur, click, rub or gallop  Neurologic: Grossly normal      Home Sleep Studies  Date/Time: 7/1/2019 5:49 PM  Performed by: Jeremie Key MD  Authorized by: Bozena Guzman MD        PHYSICIAN INTERPRETATION AND COMMENTS: Findings are consistent with severe, positional obstructive sleep  apnea (ELI). Overall AHI was 38.0 events per hour. oxygen saturation chucky was 85.4%. Total sleep time was 6.3 hr. Please  refer patient to [facility] Disorder Clinic for prompt intervention. CPAP is indicated for treatment of obstructive apnea. Close  follow-up to ensure resolution of symptoms.  CLINICAL HISTORY: 48 year old male presented with: Heavy snoring, daytime sleepiness, witnessed apnea with daytime  dysfunction. Patient also has comorbid insomnia on Ambien 10 mg. 17 inch neck, BMI of 30, an [place] sleepiness score of 1,  history of hypertension and symptoms of nocturnal snoring, waking up choking and witnessed apneas. Based on the clinical  history, the patient has a high pre-test probability of having severe ELI.  SLEEP STUDY FINDINGS: Patient underwent a one night Home Sleep Test and by behavioral criteria, slept for  approximately 6.3 hours, with a sleep latency of 7 minutes and a sleep efficiency of 90.2%. Severe sleep disordered breathing  (AHI=38) is noted based on a 4% hypopnea desaturation criteria, predominantly in the supine position (52 events/hour). The  patient slept supine 65.1% of the night based on valid recording time of 6.33 hours and is 4.3 times as likely to have  apneas/hypopneas when supine.  The apneas/hypopneas are accompanied by minimal oxygen desaturation (percent time below  90% SpO2: 1.2%, Min SpO2: 85.4%). The average desaturation across all sleep disordered breathing events is 4.4%. Snoring  occurs for 46.8% (30 dB) of the study, 34.4% is very loud. The mean pulse rate is 72 BPM, with very frequent pulse rate  variability (64 events with >= 6 BPM increase/decrease per hour).  TREATMENT CONSIDERATIONS: Consider nasal continuous positive airway pressure (CPAP/AutoPAP) as the initial  treatment choice for severe obstructive sleep apnea. A mandibular advancement splint (MAS) or referral to an ENT surgeon  for modification to the airway should be considered to reduce the risk of mortality caused by severe ELI if the patient prefers  an alternative therapy or the CPAP trial is unsuccessful. A Mandibular Advancement Splint (MAS) will likely provide treatment  benefit independent of ELI severity. The patient should avoid sleeping supine; the non-supine RDI is 2.4 times less severe than  the supine RDI.  DISEASE MANAGEMENT CONSIDERATIONS: Insomnia is a symptom that can be associated with untreated ELI.  Sleeping pills may increase the severity of untreated ELI and their use should be reevaluated once the ELI is treated.      Progress Notes   Jeremie Key MD (Physician)   Pulmonary Disease      PHYSICIAN INTERPRETATION AND COMMENTS: Findings are consistent with severe, positional obstructive sleep  apnea (ELI). Overall AHI was 38.0 events per hour. oxygen saturation chucky was 85.4%. Total sleep time was 6.3 hr. Please  refer patient to Sleep Disorder Clinic for prompt intervention. CPAP is indicated for treatment of obstructive apnea. Close  follow-up to ensure resolution of symptoms.  CLINICAL HISTORY: 48 year old male presented with: Heavy snoring, daytime sleepiness, witnessed apnea with daytime  dysfunction. Patient also has comorbid insomnia on Ambien 10 mg. 17 inch neck, BMI of 30, an  [place] sleepiness score of 1,  history of hypertension and symptoms of nocturnal snoring, waking up choking and witnessed apneas. Based on the clinical  history, the patient has a high pre-test probability of having severe ELI.  SLEEP STUDY FINDINGS: Patient underwent a one night Home Sleep Test and by behavioral criteria, slept for  approximately 6.3 hours, with a sleep latency of 7 minutes and a sleep efficiency of 90.2%. Severe sleep disordered breathing  (AHI=38) is noted based on a 4% hypopnea desaturation criteria, predominantly in the supine position (52 events/hour). The  patient slept supine 65.1% of the night based on valid recording time of 6.33 hours and is 4.3 times as likely to have  apneas/hypopneas when supine. The apneas/hypopneas are accompanied by minimal oxygen desaturation (percent time below  90% SpO2: 1.2%, Min SpO2: 85.4%). The average desaturation across all sleep disordered breathing events is 4.4%. Snoring  occurs for 46.8% (30 dB) of the study, 34.4% is very loud. The mean pulse rate is 72 BPM, with very frequent pulse rate  variability (64 events with >= 6 BPM increase/decrease per hour).  TREATMENT CONSIDERATIONS: Consider nasal continuous positive airway pressure (CPAP/AutoPAP) as the initial  treatment choice for severe obstructive sleep apnea. A mandibular advancement splint (MAS) or referral to an ENT surgeon  for modification to the airway should be considered to reduce the risk of mortality caused by severe ELI if the patient prefers  an alternative therapy or the CPAP trial is unsuccessful. A Mandibular Advancement Splint (MAS) will likely provide treatment  benefit independent of ELI severity. The patient should avoid sleeping supine; the non-supine RDI is 2.4 times less severe than  the supine RDI.  DISEASE MANAGEMENT CONSIDERATIONS: Insomnia is a symptom that can be associated with untreated ELI.  Sleeping pills may increase the severity of untreated ELI and their use should  be reevaluated once the ELI is treated.      Instructions      After Visit Summary (Printed 7/1/2019)         Assessment:      obstructive sleep apnea           1. ELI on CPAP    2. ELI (obstructive sleep apnea)        Outpatient Encounter Medications as of 9/10/2019   Medication Sig Dispense Refill    aspirin (ECOTRIN) 81 MG EC tablet Take 81 mg by mouth.      candesartan-hydrochlorothiazide (ATACAND HCT) 16-12.5 mg per tablet Take 1 tablet by mouth once daily. 90 tablet 3    nitroGLYCERIN (NITROSTAT) 0.4 MG SL tablet Place 1 tablet (0.4 mg total) under the tongue every 5 (five) minutes as needed. 30 tablet 0    omega 3-dha-epa-fish oil 900-1,400 mg CpDR Take by mouth once daily.       fexofenadine (ALLEGRA) 180 MG tablet Take 1 tablet (180 mg total) by mouth once daily. 30 tablet 0    zolpidem (AMBIEN) 10 mg Tab Take 1 tablet (10 mg total) by mouth nightly as needed. 30 tablet 2     No facility-administered encounter medications on file as of 9/10/2019.      Orders Placed This Encounter   Procedures    CPAP/BIPAP SUPPLIES     Try hybrid type mask to get pressure off bridge of nose.  Decrease pressure 6- 14     Order Specific Question:   Type of mask:     Answer:   FFM     Order Specific Question:   Headgear?     Answer:   Yes     Order Specific Question:   Tubing?     Answer:   Yes     Order Specific Question:   Humidifier chamber?     Answer:   Yes     Order Specific Question:   Chin strap?     Answer:   Yes     Order Specific Question:   Filters?     Answer:   Yes     Order Specific Question:   Cushions?     Answer:   Yes     Order Specific Question:   Length of need (1-99 months):     Answer:   99     Order Specific Question:   Vendor:     Answer:   [facility]     Order Specific Question:   Expected Date of Delivery:     Answer:   11/21/2019    CPAP FOR HOME USE     [facility]  has machine - change pressure to 6- 14 and needs hybrid type mask  [facility] for CPAP/Oxygen/Nebulizer supplies.  Customer  Service: [phone]  Call: [phone]  Fax: [phone]  Billing Inquiries: [phone] or [phone]     Order Specific Question:   Type:     Answer:   Auto CPAP     Order Specific Question:   Auto CPAP pressure setting range (cmH20):     Answer:   6-14 cm     Order Specific Question:   Length of need (1-99 months):     Answer:   99     Order Specific Question:   Humidification:     Answer:   Heated     Order Specific Question:   Type of mask:     Answer:   FFM     Order Specific Question:   Headgear?     Answer:   Yes     Order Specific Question:   Tubing?     Answer:   Yes     Order Specific Question:   Humidifier chamber?     Answer:   Yes     Order Specific Question:   Chin strap?     Answer:   Yes     Order Specific Question:   Filters?     Answer:   Yes     Order Specific Question:   Cushions?     Answer:   Yes     Plan:      Follow-up in 6 weeks, sooner should new symptoms or problems arise.        Follow up in about 7 weeks (around 10/29/2019) for CPAP initial download - bring CPAP machine.    Schedule follow up one week after completion of sleep study.  MEDICAL DECISION MAKING: Moderate to high complexity.  Overall, the multiple problems listed are of moderate to high severity that may impact quality of life and activities of daily living. Side effects of medications, treatment plan as well as options and alternatives reviewed and discussed with patient. There was counseling of patient concerning these issues.    Total time spent in face to face counseling and coordination of care - 60 minutes over 50% of time was used in discussion of prognosis, risks, benefits of treatment, instructions and compliance with regimen . Discussion with other physicians or health care providers occurred.

## 2019-10-16 DIAGNOSIS — G47.09 OTHER INSOMNIA: ICD-10-CM

## 2019-10-17 RX ORDER — ZOLPIDEM TARTRATE 10 MG/1
TABLET ORAL
Qty: 30 TABLET | Refills: 0 | Status: SHIPPED | OUTPATIENT
Start: 2019-10-17 | End: 2019-12-09 | Stop reason: SDUPTHER

## 2019-11-13 ENCOUNTER — OFFICE VISIT (OUTPATIENT)
Dept: PULMONOLOGY | Facility: CLINIC | Age: 49
End: 2019-11-13
Payer: COMMERCIAL

## 2019-11-13 VITALS
BODY MASS INDEX: 31.72 KG/M2 | SYSTOLIC BLOOD PRESSURE: 124 MMHG | HEART RATE: 96 BPM | WEIGHT: 221.56 LBS | HEIGHT: 70 IN | RESPIRATION RATE: 18 BRPM | OXYGEN SATURATION: 96 % | DIASTOLIC BLOOD PRESSURE: 82 MMHG

## 2019-11-13 DIAGNOSIS — E78.2 MIXED HYPERLIPIDEMIA: ICD-10-CM

## 2019-11-13 DIAGNOSIS — F51.01 PRIMARY INSOMNIA: ICD-10-CM

## 2019-11-13 DIAGNOSIS — E66.9 OBESITY (BMI 30.0-34.9): Primary | ICD-10-CM

## 2019-11-13 DIAGNOSIS — I10 ESSENTIAL HYPERTENSION: ICD-10-CM

## 2019-11-13 DIAGNOSIS — G47.33 OSA (OBSTRUCTIVE SLEEP APNEA): ICD-10-CM

## 2019-11-13 PROCEDURE — 99999 PR PBB SHADOW E&M-EST. PATIENT-LVL IV: ICD-10-PCS | Mod: PBBFAC,,, | Performed by: NURSE PRACTITIONER

## 2019-11-13 PROCEDURE — 3079F DIAST BP 80-89 MM HG: CPT | Mod: CPTII,S$GLB,, | Performed by: NURSE PRACTITIONER

## 2019-11-13 PROCEDURE — 99214 PR OFFICE/OUTPT VISIT, EST, LEVL IV, 30-39 MIN: ICD-10-PCS | Mod: S$GLB,,, | Performed by: NURSE PRACTITIONER

## 2019-11-13 PROCEDURE — 99999 PR PBB SHADOW E&M-EST. PATIENT-LVL IV: CPT | Mod: PBBFAC,,, | Performed by: NURSE PRACTITIONER

## 2019-11-13 PROCEDURE — 3074F PR MOST RECENT SYSTOLIC BLOOD PRESSURE < 130 MM HG: ICD-10-PCS | Mod: CPTII,S$GLB,, | Performed by: NURSE PRACTITIONER

## 2019-11-13 PROCEDURE — 3074F SYST BP LT 130 MM HG: CPT | Mod: CPTII,S$GLB,, | Performed by: NURSE PRACTITIONER

## 2019-11-13 PROCEDURE — 3008F BODY MASS INDEX DOCD: CPT | Mod: CPTII,S$GLB,, | Performed by: NURSE PRACTITIONER

## 2019-11-13 PROCEDURE — 99214 OFFICE O/P EST MOD 30 MIN: CPT | Mod: S$GLB,,, | Performed by: NURSE PRACTITIONER

## 2019-11-13 PROCEDURE — 3008F PR BODY MASS INDEX (BMI) DOCUMENTED: ICD-10-PCS | Mod: CPTII,S$GLB,, | Performed by: NURSE PRACTITIONER

## 2019-11-13 PROCEDURE — 3079F PR MOST RECENT DIASTOLIC BLOOD PRESSURE 80-89 MM HG: ICD-10-PCS | Mod: CPTII,S$GLB,, | Performed by: NURSE PRACTITIONER

## 2019-11-13 NOTE — ASSESSMENT & PLAN NOTE
Has been able to reduce Ambien 10 mg to half tablet (5 mg) about 3-4 nights a week  Compliant with CPAP

## 2019-11-13 NOTE — PROGRESS NOTES
Subjective:      Patient ID: Nehemias Zhang is a 49 y.o. male.    Chief Complaint: Sleep Apnea    HPI: Nehemias Zhang is here for follow up for ELI with initial CPAP complaince assessment.  He is on Auto CPAP of 6 -14 cmH2O pressure which is optimally controlling sleep apnea with apneic index (AHI) 2.1 events an hour.   He is compliant with CPAP use. Complaince download today reveals 86.7% of days with greater than 4 hours of device use.   Patient reports benefit from CPAP use and denies snoring, sleeping more soundly. Less awakenings.  Did not have daytime sleepiness before CPAP.   Patient reports no complaints. Floresita view Full face mask is well tolerated.   [place] 2     Insomnia  Improved since CPAP sleeps more soundly and able to reduce Ambein 10 mg 1/2 tab (5 mg) about 3 nights a week.     Sleep time: 0900 - 1000  Awake time: 0530     Previous Report Reviewed: lab reports and office notes     Past Medical History: The following portions of the patient's history were reviewed and updated as appropriate:   He  has a past surgical history that includes Tonsillectomy.  His family history includes Colon cancer in his maternal grandfather and paternal grandfather; Heart disease in his mother; Prostate cancer in his brother.  He  reports that he quit smoking about 19 months ago. He has never used smokeless tobacco. He reports that he does not drink alcohol or use drugs.  He has a current medication list which includes the following prescription(s): aspirin, candesartan-hydrochlorothiazide, omega 3-dha-epa-fish oil, zolpidem, afluria qd 2019-20(3yr up)(pf), fexofenadine, and nitroglycerin.  He has No Known Allergies..    The following portions of the patient's history were reviewed and updated as appropriate: allergies, current medications, past family history, past medical history, past social history, past surgical history and problem list.    Review of Systems   Constitutional: Negative for fever, chills,  "weight loss, weight gain, activity change, appetite change, fatigue and night sweats.   HENT: Negative for postnasal drip, rhinorrhea, sinus pressure, voice change and congestion.    Eyes: Negative for redness and itching.   Respiratory: Negative for snoring, cough, sputum production, chest tightness, shortness of breath, wheezing, orthopnea, asthma nighttime symptoms, dyspnea on extertion, use of rescue inhaler and somnolence.    Cardiovascular: Negative.  Negative for chest pain, palpitations and leg swelling.   Genitourinary: Negative for difficulty urinating and hematuria.   Endocrine: Negative for cold intolerance and heat intolerance.    Musculoskeletal: Negative for arthralgias, gait problem, joint swelling and myalgias.   Skin: Negative.    Gastrointestinal: Negative for nausea, vomiting, abdominal pain and acid reflux.   Neurological: Negative for dizziness, weakness, light-headedness and headaches.   Hematological: Negative for adenopathy. No excessive bruising.   All other systems reviewed and are negative.     Objective:   /82   Pulse 96   Resp 18   Ht 5' 10" (1.778 m)   Wt 100.5 kg (221 lb 9 oz)   SpO2 96%   BMI 31.79 kg/m²   Physical Exam   Constitutional: He is oriented to person, place, and time. He appears well-developed and well-nourished. He is active and cooperative.  Non-toxic appearance. He does not appear ill. No distress.   HENT:   Head: Normocephalic and atraumatic.   Right Ear: External ear normal.   Left Ear: External ear normal.   Nose: Nose normal.   Mouth/Throat: Oropharynx is clear and moist. No oropharyngeal exudate.   Eyes: Conjunctivae are normal.   Neck: Normal range of motion. Neck supple.   Cardiovascular: Normal rate, regular rhythm, normal heart sounds and intact distal pulses.   Pulmonary/Chest: Effort normal and breath sounds normal.   Abdominal: Soft.   Musculoskeletal: He exhibits no edema.   Neurological: He is alert and oriented to person, place, and time. "   Skin: Skin is warm and dry.   Psychiatric: He has a normal mood and affect. His behavior is normal. Judgment and thought content normal.   Vitals reviewed.    Personal Diagnostic Review  CPAP download  APAP 6-14 cm  Compliance Summary  10/14/2019 - 11/12/2019 (30 days)  Days with Device Usage 29 days  Days without Device Usage 1 day  Percent Days with Device Usage 96.7%  Cumulative Usage 8 days 17 hrs. 30 mins. 40 secs.  Maximum Usage (1 Day) 10 hrs. 5 mins. 54 secs.  Average Usage (All Days) 6 hrs. 59 mins. 1 secs.  Average Usage (Days Used) 7 hrs. 13 mins. 28 secs.  Minimum Usage (1 Day) 1 hrs. 34 mins. 12 secs.  Percent of Days with Usage >= 4 Hours 86.7%  Percent of Days with Usage < 4 Hours 13.3%  Date Range  Total Blower Time 8 days 18 hrs. 34 mins. 59 secs.  Average AHI 2.1  Auto-CPAP Summary  Auto-CPAP Mean Pressure 10.4 cmH2O  Auto-CPAP Peak Average Pressure 13.4 cmH2O  Average Device Pressure <= 90% of Time 12.7 cmH2O  Average Time in Large Leak Per Day 29 secs.    Home Sleep Study    Overall AHI was 38.0 events per hour. Oxygen saturation chucky was 85.4%. Total sleep time was 6.3 hr.    Assessment:     1. Obesity (BMI 30.0-34.9)    2. ELI (obstructive sleep apnea)    3. Mixed hyperlipidemia    4. Primary insomnia    5. Essential hypertension      No orders of the defined types were placed in this encounter.    Plan:     Problem List Items Addressed This Visit     Primary insomnia     Has been able to reduce Ambien 10 mg to half tablet (5 mg) about 3-4 nights a week  Compliant with CPAP         ELI (obstructive sleep apnea)     Benefits and compliant with Auto CPAP 6-14 cm  AHI: 2.1  Floresita view Full face mask  HME: [facility]   Follow-up in 6 months for compliance download and supply order         Obesity (BMI 30.0-34.9) - Primary     Encouraged calorie reduction and 30 minutes of exercise daily. Discussed impact of obesity on general health.  Over the past month has reduce calories, less knocking and added  salads at lunch.   Has a walking program 2-5 days/week.  Thinking about joining a gym.         Mixed hyperlipidemia     Followed by PCP         Essential hypertension     Followed by PCP           TIME SPENT WITH PATIENT: Time spent:25 minutes in face to face  discussion concerning diagnosis, prognosis, review of lab and test results, benefits of treatment as well as management of disease, counseling of patient and coordination of care between various health  care providers . Greater than half the time spent was used for coordination of care and counseling of patient.      (DME) - [facility]  Reviewed therapeutic goals for positive airway pressure therapy Auto CPAP  Ideal is usage 100% of nights for 6 - 8 hours per night. Minimum usage is 70% of night for at least 4 hours per night used.     Follow up in about 6 months (around 5/13/2020) for CPAP 6 mo compliance download.

## 2019-11-13 NOTE — ASSESSMENT & PLAN NOTE
Encouraged calorie reduction and 30 minutes of exercise daily. Discussed impact of obesity on general health.  Over the past month has reduce calories, less knocking and added salads at lunch.   Has a walking program 2-5 days/week.  Thinking about joining a gym.

## 2019-11-13 NOTE — ASSESSMENT & PLAN NOTE
Benefits and compliant with Auto CPAP 6-14 cm  AHI: 2.1  Floresita view Full face mask  HME: [facility]   Follow-up in 6 months for compliance download and supply order

## 2019-11-18 DIAGNOSIS — I10 HTN, GOAL BELOW 130/80: ICD-10-CM

## 2019-11-18 RX ORDER — CANDESARTAN CILEXETIL AND HYDROCHLOROTHIAZIDE 16; 12.5 MG/1; MG/1
TABLET ORAL
Qty: 90 TABLET | Refills: 0 | Status: SHIPPED | OUTPATIENT
Start: 2019-11-18 | End: 2020-02-17

## 2019-12-09 DIAGNOSIS — G47.09 OTHER INSOMNIA: ICD-10-CM

## 2019-12-10 RX ORDER — ZOLPIDEM TARTRATE 10 MG/1
TABLET ORAL
Qty: 30 TABLET | Refills: 0 | Status: SHIPPED | OUTPATIENT
Start: 2019-12-10 | End: 2020-01-28

## 2020-01-27 DIAGNOSIS — G47.09 OTHER INSOMNIA: ICD-10-CM

## 2020-01-28 RX ORDER — ZOLPIDEM TARTRATE 10 MG/1
TABLET ORAL
Qty: 30 TABLET | Refills: 0 | Status: SHIPPED | OUTPATIENT
Start: 2020-01-28 | End: 2020-03-15 | Stop reason: SDUPTHER

## 2020-02-17 DIAGNOSIS — I10 HTN, GOAL BELOW 130/80: ICD-10-CM

## 2020-02-17 RX ORDER — CANDESARTAN CILEXETIL AND HYDROCHLOROTHIAZIDE 16; 12.5 MG/1; MG/1
TABLET ORAL
Qty: 90 TABLET | Refills: 0 | Status: SHIPPED | OUTPATIENT
Start: 2020-02-17 | End: 2020-05-14

## 2020-03-08 DIAGNOSIS — G47.09 OTHER INSOMNIA: ICD-10-CM

## 2020-03-09 DIAGNOSIS — G47.09 OTHER INSOMNIA: ICD-10-CM

## 2020-03-10 RX ORDER — ZOLPIDEM TARTRATE 10 MG/1
TABLET ORAL
Qty: 30 TABLET | Refills: 0 | OUTPATIENT
Start: 2020-03-10

## 2020-03-15 DIAGNOSIS — G47.09 OTHER INSOMNIA: ICD-10-CM

## 2020-03-16 RX ORDER — ZOLPIDEM TARTRATE 10 MG/1
TABLET ORAL
Qty: 30 TABLET | Refills: 0 | Status: SHIPPED | OUTPATIENT
Start: 2020-03-16 | End: 2020-05-14 | Stop reason: SDUPTHER

## 2020-05-12 ENCOUNTER — TELEPHONE (OUTPATIENT)
Dept: PULMONOLOGY | Facility: CLINIC | Age: 50
End: 2020-05-12

## 2020-05-12 DIAGNOSIS — I10 HTN, GOAL BELOW 130/80: ICD-10-CM

## 2020-05-13 ENCOUNTER — OFFICE VISIT (OUTPATIENT)
Dept: PULMONOLOGY | Facility: CLINIC | Age: 50
End: 2020-05-13
Payer: COMMERCIAL

## 2020-05-13 VITALS — WEIGHT: 221 LBS | HEIGHT: 70 IN | BODY MASS INDEX: 31.64 KG/M2

## 2020-05-13 DIAGNOSIS — E66.9 OBESITY (BMI 30.0-34.9): ICD-10-CM

## 2020-05-13 DIAGNOSIS — F51.01 PRIMARY INSOMNIA: ICD-10-CM

## 2020-05-13 DIAGNOSIS — G47.33 OSA (OBSTRUCTIVE SLEEP APNEA): Primary | ICD-10-CM

## 2020-05-13 PROCEDURE — 3008F PR BODY MASS INDEX (BMI) DOCUMENTED: ICD-10-PCS | Mod: CPTII,,, | Performed by: NURSE PRACTITIONER

## 2020-05-13 PROCEDURE — 99213 PR OFFICE/OUTPT VISIT, EST, LEVL III, 20-29 MIN: ICD-10-PCS | Mod: 95,,, | Performed by: NURSE PRACTITIONER

## 2020-05-13 PROCEDURE — 99213 OFFICE O/P EST LOW 20 MIN: CPT | Mod: 95,,, | Performed by: NURSE PRACTITIONER

## 2020-05-13 PROCEDURE — 3008F BODY MASS INDEX DOCD: CPT | Mod: CPTII,,, | Performed by: NURSE PRACTITIONER

## 2020-05-13 NOTE — PROGRESS NOTES
"Subjective:      Patient ID: Nehemias Zhang is a 49 y.o. male.    Chief Complaint: Sleep Apnea  The patient location is: Home  The chief complaint leading to consultation is: sleep  Visit type: Virtual visit with synchronous audio and video  Total time spent with patient: [id number]   Each patient to whom he or she provides medical services by telemedicine is:  (1) informed of the relationship between the physician and patient and the respective role of any other health care provider with respect to management of the patient; and (2) notified that he or she may decline to receive medical services by telemedicine and may withdraw from such care at any time.    HPI  Presents to office for review of AutoPAP therapy. Patient states improved symptoms with use of AutoPAP. Sleeping more soundly. Waking up feeling more refreshed. Improved daytime sleepiness. Patient states he is benefiting from use of the AutoPAP. He continues to have insomnia which started in childhood.   He has a regular sleep schedule.   Ambien if needed.    Patient Active Problem List   Diagnosis    Primary insomnia    Erectile dysfunction    Family history of prostate cancer    Mixed hyperlipidemia    Other chest pain    Essential hypertension    ELI (obstructive sleep apnea)    Obesity (BMI 30.0-34.9)       Ht 5' 10" (1.778 m)   Wt 100.2 kg (221 lb)   BMI 31.71 kg/m²   Body mass index is 31.71 kg/m².    Review of Systems   Psychiatric/Behavioral: Positive for sleep disturbance.   All other systems reviewed and are negative.    Objective:      Physical Exam   Constitutional: He is oriented to person, place, and time. He appears well-developed and well-nourished.   HENT:   Head: Normocephalic and atraumatic.   Right Ear: External ear normal.   Left Ear: External ear normal.   Nose: Nose normal.   Eyes: Conjunctivae and lids are normal.   Neck: Normal range of motion.   Pulmonary/Chest: Effort normal. No accessory muscle usage. No tachypnea " and no bradypnea. No respiratory distress.   Neurological: He is alert and oriented to person, place, and time.   Skin: No rash noted.   Psychiatric: He has a normal mood and affect. His behavior is normal. Judgment and thought content normal.     Personal Diagnostic Review  Review of PAP download. Special study media link attached to this encounter. Normal AHI and compliant.     Assessment:       1. ELI (obstructive sleep apnea)    2. Primary insomnia    3. Obesity (BMI 30.0-34.9)        Outpatient Encounter Medications as of 5/13/2020   Medication Sig Dispense Refill    AFLURIA QD 2019-20,3YR UP,,PF, 60 mcg (15 mcg x 4)/0.5 mL Syrg ADM 0.5ML IM UTD  0    aspirin (ECOTRIN) 81 MG EC tablet Take 81 mg by mouth.      candesartan-hydrochlorothiazide (ATACAND HCT) 16-12.5 mg per tablet TAKE 1 TABLET BY MOUTH EVERY DAY 90 tablet 0    fexofenadine (ALLEGRA) 180 MG tablet Take 1 tablet (180 mg total) by mouth once daily. 30 tablet 0    nitroGLYCERIN (NITROSTAT) 0.4 MG SL tablet Place 1 tablet (0.4 mg total) under the tongue every 5 (five) minutes as needed. 30 tablet 0    omega 3-dha-epa-fish oil 900-1,400 mg CpDR Take by mouth once daily.       zolpidem (AMBIEN) 10 mg Tab TAKE 1 TABLET BY MOUTH EVERY DAY AT NIGHT AS NEEDED 30 tablet 0     No facility-administered encounter medications on file as of 5/13/2020.      Orders Placed This Encounter   Procedures    CPAP/BIPAP SUPPLIES     90 day supply with 3 refills.     Order Specific Question:   Type of mask:     Answer:   FFM     Order Specific Question:   Headgear?     Answer:   Yes     Order Specific Question:   Tubing?     Answer:   Yes     Order Specific Question:   Humidifier chamber?     Answer:   Yes     Order Specific Question:   Chin strap?     Answer:   Yes     Order Specific Question:   Filters?     Answer:   Yes     Order Specific Question:   Cushions?     Answer:   Yes     Order Specific Question:   Length of need (1-99 months):     Answer:   99      Plan:        Problem List Items Addressed This Visit        Endocrine    Obesity (BMI 30.0-34.9)    Current Assessment & Plan     Weight loss and exercise to improve overall health.              Other    Primary insomnia    Current Assessment & Plan     Ambien if needed.         ELI (obstructive sleep apnea) - Primary    Overview     Compliant with PAP and benefits from use. Follow up annually in the sleep clinic.           Relevant Orders    CPAP/BIPAP SUPPLIES

## 2020-05-14 ENCOUNTER — OFFICE VISIT (OUTPATIENT)
Dept: FAMILY MEDICINE | Facility: CLINIC | Age: 50
End: 2020-05-14
Payer: COMMERCIAL

## 2020-05-14 VITALS
HEIGHT: 70 IN | OXYGEN SATURATION: 98 % | TEMPERATURE: 99 F | DIASTOLIC BLOOD PRESSURE: 78 MMHG | WEIGHT: 230.25 LBS | BODY MASS INDEX: 32.96 KG/M2 | SYSTOLIC BLOOD PRESSURE: 120 MMHG | HEART RATE: 89 BPM

## 2020-05-14 DIAGNOSIS — G47.09 OTHER INSOMNIA: ICD-10-CM

## 2020-05-14 DIAGNOSIS — F51.01 PRIMARY INSOMNIA: Primary | ICD-10-CM

## 2020-05-14 PROCEDURE — 3074F SYST BP LT 130 MM HG: CPT | Mod: CPTII,S$GLB,, | Performed by: FAMILY MEDICINE

## 2020-05-14 PROCEDURE — 3078F DIAST BP <80 MM HG: CPT | Mod: CPTII,S$GLB,, | Performed by: FAMILY MEDICINE

## 2020-05-14 PROCEDURE — 99999 PR PBB SHADOW E&M-EST. PATIENT-LVL III: ICD-10-PCS | Mod: PBBFAC,,, | Performed by: FAMILY MEDICINE

## 2020-05-14 PROCEDURE — 3074F PR MOST RECENT SYSTOLIC BLOOD PRESSURE < 130 MM HG: ICD-10-PCS | Mod: CPTII,S$GLB,, | Performed by: FAMILY MEDICINE

## 2020-05-14 PROCEDURE — 99213 OFFICE O/P EST LOW 20 MIN: CPT | Mod: S$GLB,,, | Performed by: FAMILY MEDICINE

## 2020-05-14 PROCEDURE — 99213 PR OFFICE/OUTPT VISIT, EST, LEVL III, 20-29 MIN: ICD-10-PCS | Mod: S$GLB,,, | Performed by: FAMILY MEDICINE

## 2020-05-14 PROCEDURE — 3008F PR BODY MASS INDEX (BMI) DOCUMENTED: ICD-10-PCS | Mod: CPTII,S$GLB,, | Performed by: FAMILY MEDICINE

## 2020-05-14 PROCEDURE — 3008F BODY MASS INDEX DOCD: CPT | Mod: CPTII,S$GLB,, | Performed by: FAMILY MEDICINE

## 2020-05-14 PROCEDURE — 99999 PR PBB SHADOW E&M-EST. PATIENT-LVL III: CPT | Mod: PBBFAC,,, | Performed by: FAMILY MEDICINE

## 2020-05-14 PROCEDURE — 3078F PR MOST RECENT DIASTOLIC BLOOD PRESSURE < 80 MM HG: ICD-10-PCS | Mod: CPTII,S$GLB,, | Performed by: FAMILY MEDICINE

## 2020-05-14 RX ORDER — CANDESARTAN CILEXETIL AND HYDROCHLOROTHIAZIDE 16; 12.5 MG/1; MG/1
TABLET ORAL
Qty: 90 TABLET | Refills: 1 | Status: SHIPPED | OUTPATIENT
Start: 2020-05-14 | End: 2020-11-03 | Stop reason: SDUPTHER

## 2020-05-14 RX ORDER — ZOLPIDEM TARTRATE 10 MG/1
10 TABLET ORAL NIGHTLY PRN
Qty: 30 TABLET | Refills: 5 | Status: SHIPPED | OUTPATIENT
Start: 2020-05-14 | End: 2020-12-08 | Stop reason: SDUPTHER

## 2020-05-14 NOTE — PROGRESS NOTES
Chief Complaint:    Chief Complaint   Patient presents with    Medication Refill       History of Present Illness:    Presents today for Ambien refills been working okay but does not take it every single time sometimes cuts in half.  Chronic see if he could try something else he is taking Lunesta before.    ROS:  Review of Systems   Constitutional: Negative for activity change, chills, fatigue, fever and unexpected weight change.   HENT: Negative for congestion, ear discharge, ear pain, hearing loss, postnasal drip and rhinorrhea.    Eyes: Negative for pain and visual disturbance.   Respiratory: Negative for cough, chest tightness and shortness of breath.    Cardiovascular: Negative for chest pain and palpitations.   Gastrointestinal: Negative for abdominal pain, diarrhea and vomiting.   Endocrine: Negative for heat intolerance.   Genitourinary: Negative for dysuria, flank pain, frequency and hematuria.   Musculoskeletal: Negative for back pain, gait problem and neck pain.   Skin: Negative for color change and rash.   Neurological: Negative for dizziness, tremors, seizures, numbness and headaches.   Psychiatric/Behavioral: Negative for agitation, hallucinations, self-injury, sleep disturbance and suicidal ideas. The patient is not nervous/anxious.        Past Medical History:   Diagnosis Date    Hyperlipidemia     Hypertension     Insomnia        Social History:  Social History     Socioeconomic History    Marital status: Single     Spouse name: Not on file    Number of children: Not on file    Years of education: Not on file    Highest education level: Not on file   Occupational History    Not on file   Social Needs    Financial resource strain: Not on file    Food insecurity:     Worry: Not on file     Inability: Not on file    Transportation needs:     Medical: Not on file     Non-medical: Not on file   Tobacco Use    Smoking status: Former Smoker     Last attempt to quit: 3/26/2018     Years since  "quittin.1    Smokeless tobacco: Never Used   Substance and Sexual Activity    Alcohol use: No    Drug use: No    Sexual activity: Yes     Partners: Female   Lifestyle    Physical activity:     Days per week: Not on file     Minutes per session: Not on file    Stress: Not on file   Relationships    Social connections:     Talks on phone: Not on file     Gets together: Not on file     Attends Protestant service: Not on file     Active member of club or organization: Not on file     Attends meetings of clubs or organizations: Not on file     Relationship status: Not on file   Other Topics Concern    Not on file   Social History Narrative    Not on file       Family History:   family history includes Colon cancer in his maternal grandfather and paternal grandfather; Heart disease in his mother; Prostate cancer in his brother.    Health Maintenance   Topic Date Due    Lipid Panel  2024    TETANUS VACCINE  2028       Physical Exam:    Vital Signs  Temp: 98.8 °F (37.1 °C)  Temp src: Oral  Pulse: 89  SpO2: 98 %  BP: 120/78  BP Location: Left arm  Patient Position: Sitting  Pain Score: 0-No pain  Height and Weight  Height: 5' 10" (177.8 cm)  Weight: 104.5 kg (230 lb 4.3 oz)  BSA (Calculated - sq m): 2.27 sq meters  BMI (Calculated): 33  Weight in (lb) to have BMI = 25: 173.9]    Body mass index is 33.04 kg/m².    Physical Exam      Assessment:      ICD-10-CM ICD-9-CM   1. Primary insomnia F51.01 307.42   2. Other insomnia G47.09 780.52         Plan:        After hearing about other potential medication options he refused and wants to stick to Ambien  Discussed cognitive behavioral therapy with him and he will try.  Ambien refill for now  Will follow-up with PCP for other issues        No orders of the defined types were placed in this encounter.      Current Outpatient Medications   Medication Sig Dispense Refill    aspirin (ECOTRIN) 81 MG EC tablet Take 81 mg by mouth.      " candesartan-hydrochlorothiazide (ATACAND HCT) 16-12.5 mg per tablet TAKE 1 TABLET BY MOUTH EVERY DAY 90 tablet 0    omega 3-dha-epa-fish oil 900-1,400 mg CpDR Take by mouth once daily.       zolpidem (AMBIEN) 10 mg Tab Take 1 tablet (10 mg total) by mouth nightly as needed. 30 tablet 5    fexofenadine (ALLEGRA) 180 MG tablet Take 1 tablet (180 mg total) by mouth once daily. 30 tablet 0    nitroGLYCERIN (NITROSTAT) 0.4 MG SL tablet Place 1 tablet (0.4 mg total) under the tongue every 5 (five) minutes as needed. 30 tablet 0     No current facility-administered medications for this visit.        Medications Discontinued During This Encounter   Medication Reason    AFLURIA QD 2019-20,3YR UP,,PF, 60 mcg (15 mcg x 4)/0.5 mL Syrg Therapy completed    zolpidem (AMBIEN) 10 mg Tab Reorder       No follow-ups on file.      Lora Capps MD

## 2020-07-13 ENCOUNTER — PATIENT OUTREACH (OUTPATIENT)
Dept: ADMINISTRATIVE | Facility: HOSPITAL | Age: 50
End: 2020-07-13

## 2020-07-13 NOTE — PROGRESS NOTES
Contacted patient to schedule annual pcp appointment. Patient has an appointment scheduled. 08/04/2020

## 2020-08-04 ENCOUNTER — LAB VISIT (OUTPATIENT)
Dept: LAB | Facility: HOSPITAL | Age: 50
End: 2020-08-04
Attending: FAMILY MEDICINE
Payer: COMMERCIAL

## 2020-08-04 ENCOUNTER — OFFICE VISIT (OUTPATIENT)
Dept: FAMILY MEDICINE | Facility: CLINIC | Age: 50
End: 2020-08-04
Payer: COMMERCIAL

## 2020-08-04 VITALS
WEIGHT: 232.94 LBS | BODY MASS INDEX: 33.35 KG/M2 | HEART RATE: 76 BPM | OXYGEN SATURATION: 96 % | DIASTOLIC BLOOD PRESSURE: 64 MMHG | HEIGHT: 70 IN | TEMPERATURE: 98 F | SYSTOLIC BLOOD PRESSURE: 112 MMHG

## 2020-08-04 DIAGNOSIS — Z12.5 PROSTATE CANCER SCREENING: ICD-10-CM

## 2020-08-04 DIAGNOSIS — Z00.01 ANNUAL VISIT FOR GENERAL ADULT MEDICAL EXAMINATION WITH ABNORMAL FINDINGS: ICD-10-CM

## 2020-08-04 DIAGNOSIS — F51.01 PRIMARY INSOMNIA: ICD-10-CM

## 2020-08-04 DIAGNOSIS — I10 ESSENTIAL HYPERTENSION: ICD-10-CM

## 2020-08-04 DIAGNOSIS — Z11.59 ENCOUNTER FOR HEPATITIS C SCREENING TEST FOR LOW RISK PATIENT: ICD-10-CM

## 2020-08-04 DIAGNOSIS — Z12.11 COLON CANCER SCREENING: ICD-10-CM

## 2020-08-04 DIAGNOSIS — G47.33 OSA (OBSTRUCTIVE SLEEP APNEA): ICD-10-CM

## 2020-08-04 DIAGNOSIS — E66.9 OBESITY (BMI 30.0-34.9): ICD-10-CM

## 2020-08-04 DIAGNOSIS — Z00.01 ANNUAL VISIT FOR GENERAL ADULT MEDICAL EXAMINATION WITH ABNORMAL FINDINGS: Primary | ICD-10-CM

## 2020-08-04 LAB
ALBUMIN SERPL BCP-MCNC: 4 G/DL (ref 3.5–5.2)
ALP SERPL-CCNC: 46 U/L (ref 55–135)
ALT SERPL W/O P-5'-P-CCNC: 32 U/L (ref 10–44)
ANION GAP SERPL CALC-SCNC: 8 MMOL/L (ref 8–16)
AST SERPL-CCNC: 19 U/L (ref 10–40)
BASOPHILS # BLD AUTO: 0.06 K/UL (ref 0–0.2)
BASOPHILS NFR BLD: 0.8 % (ref 0–1.9)
BILIRUB SERPL-MCNC: 0.4 MG/DL (ref 0.1–1)
BUN SERPL-MCNC: 10 MG/DL (ref 6–20)
CALCIUM SERPL-MCNC: 9.2 MG/DL (ref 8.7–10.5)
CHLORIDE SERPL-SCNC: 103 MMOL/L (ref 95–110)
CHOLEST SERPL-MCNC: 206 MG/DL (ref 120–199)
CHOLEST/HDLC SERPL: 5 {RATIO} (ref 2–5)
CO2 SERPL-SCNC: 27 MMOL/L (ref 23–29)
COMPLEXED PSA SERPL-MCNC: 0.79 NG/ML (ref 0–4)
CREAT SERPL-MCNC: 0.9 MG/DL (ref 0.5–1.4)
DIFFERENTIAL METHOD: ABNORMAL
EOSINOPHIL # BLD AUTO: 0.2 K/UL (ref 0–0.5)
EOSINOPHIL NFR BLD: 3.1 % (ref 0–8)
ERYTHROCYTE [DISTWIDTH] IN BLOOD BY AUTOMATED COUNT: 12.2 % (ref 11.5–14.5)
EST. GFR  (AFRICAN AMERICAN): >60 ML/MIN/1.73 M^2
EST. GFR  (NON AFRICAN AMERICAN): >60 ML/MIN/1.73 M^2
GLUCOSE SERPL-MCNC: 94 MG/DL (ref 70–110)
HCT VFR BLD AUTO: 44.5 % (ref 40–54)
HDLC SERPL-MCNC: 41 MG/DL (ref 40–75)
HDLC SERPL: 19.9 % (ref 20–50)
HGB BLD-MCNC: 14.8 G/DL (ref 14–18)
IMM GRANULOCYTES # BLD AUTO: 0.08 K/UL (ref 0–0.04)
IMM GRANULOCYTES NFR BLD AUTO: 1.1 % (ref 0–0.5)
LDLC SERPL CALC-MCNC: 119.8 MG/DL (ref 63–159)
LYMPHOCYTES # BLD AUTO: 1.8 K/UL (ref 1–4.8)
LYMPHOCYTES NFR BLD: 25 % (ref 18–48)
MCH RBC QN AUTO: 32.7 PG (ref 27–31)
MCHC RBC AUTO-ENTMCNC: 33.3 G/DL (ref 32–36)
MCV RBC AUTO: 99 FL (ref 82–98)
MONOCYTES # BLD AUTO: 0.7 K/UL (ref 0.3–1)
MONOCYTES NFR BLD: 10.3 % (ref 4–15)
NEUTROPHILS # BLD AUTO: 4.2 K/UL (ref 1.8–7.7)
NEUTROPHILS NFR BLD: 59.7 % (ref 38–73)
NONHDLC SERPL-MCNC: 165 MG/DL
NRBC BLD-RTO: 0 /100 WBC
PLATELET # BLD AUTO: 366 K/UL (ref 150–350)
PMV BLD AUTO: 9 FL (ref 9.2–12.9)
POTASSIUM SERPL-SCNC: 4.2 MMOL/L (ref 3.5–5.1)
PROT SERPL-MCNC: 7.1 G/DL (ref 6–8.4)
RBC # BLD AUTO: 4.52 M/UL (ref 4.6–6.2)
SODIUM SERPL-SCNC: 138 MMOL/L (ref 136–145)
TRIGL SERPL-MCNC: 226 MG/DL (ref 30–150)
TSH SERPL DL<=0.005 MIU/L-ACNC: 1.14 UIU/ML (ref 0.4–4)
WBC # BLD AUTO: 7.07 K/UL (ref 3.9–12.7)

## 2020-08-04 PROCEDURE — 80053 COMPREHEN METABOLIC PANEL: CPT

## 2020-08-04 PROCEDURE — 3074F PR MOST RECENT SYSTOLIC BLOOD PRESSURE < 130 MM HG: ICD-10-PCS | Mod: CPTII,S$GLB,, | Performed by: FAMILY MEDICINE

## 2020-08-04 PROCEDURE — 36415 COLL VENOUS BLD VENIPUNCTURE: CPT | Mod: PO

## 2020-08-04 PROCEDURE — 84153 ASSAY OF PSA TOTAL: CPT

## 2020-08-04 PROCEDURE — 3008F BODY MASS INDEX DOCD: CPT | Mod: CPTII,S$GLB,, | Performed by: FAMILY MEDICINE

## 2020-08-04 PROCEDURE — 99396 PR PREVENTIVE VISIT,EST,40-64: ICD-10-PCS | Mod: S$GLB,,, | Performed by: FAMILY MEDICINE

## 2020-08-04 PROCEDURE — 3074F SYST BP LT 130 MM HG: CPT | Mod: CPTII,S$GLB,, | Performed by: FAMILY MEDICINE

## 2020-08-04 PROCEDURE — 3008F PR BODY MASS INDEX (BMI) DOCUMENTED: ICD-10-PCS | Mod: CPTII,S$GLB,, | Performed by: FAMILY MEDICINE

## 2020-08-04 PROCEDURE — 3078F DIAST BP <80 MM HG: CPT | Mod: CPTII,S$GLB,, | Performed by: FAMILY MEDICINE

## 2020-08-04 PROCEDURE — 85025 COMPLETE CBC W/AUTO DIFF WBC: CPT

## 2020-08-04 PROCEDURE — 99999 PR PBB SHADOW E&M-EST. PATIENT-LVL IV: ICD-10-PCS | Mod: PBBFAC,,, | Performed by: FAMILY MEDICINE

## 2020-08-04 PROCEDURE — 3078F PR MOST RECENT DIASTOLIC BLOOD PRESSURE < 80 MM HG: ICD-10-PCS | Mod: CPTII,S$GLB,, | Performed by: FAMILY MEDICINE

## 2020-08-04 PROCEDURE — 80061 LIPID PANEL: CPT

## 2020-08-04 PROCEDURE — 86803 HEPATITIS C AB TEST: CPT

## 2020-08-04 PROCEDURE — 99999 PR PBB SHADOW E&M-EST. PATIENT-LVL IV: CPT | Mod: PBBFAC,,, | Performed by: FAMILY MEDICINE

## 2020-08-04 PROCEDURE — 83036 HEMOGLOBIN GLYCOSYLATED A1C: CPT

## 2020-08-04 PROCEDURE — 99396 PREV VISIT EST AGE 40-64: CPT | Mod: S$GLB,,, | Performed by: FAMILY MEDICINE

## 2020-08-04 PROCEDURE — 84443 ASSAY THYROID STIM HORMONE: CPT

## 2020-08-04 NOTE — PROGRESS NOTES
Subjective:       Patient ID: Nehemias Zhang is a 50 y.o. male.    Chief Complaint: Annual Exam      History of Present Illness:   Nehemias Zhang 50 y.o. male presents today with   Well Adult Physical: Patient here for a comprehensive physical exam.The patient reports problems-Obesity  Do you take any herbs or supplements that were not prescribed by a doctor? yes  Centrum for 50s. Are you taking calcium supplements? no Are you taking aspirin daily? no   History:  Brother had prostate cancer  ELI-using cpap  Insomnia: using Ambien every 3 days or so, no refill needed.  Obesity-BMI of 33.42, is beginning to cut down on calories and expected to improve further.          Past Medical History:   Diagnosis Date    Hyperlipidemia     Hypertension     Insomnia      Family History   Problem Relation Age of Onset    Heart disease Mother     Prostate cancer Brother     Colon cancer Maternal Grandfather     Colon cancer Paternal Grandfather      Social History     Socioeconomic History    Marital status: Single     Spouse name: Not on file    Number of children: Not on file    Years of education: Not on file    Highest education level: Not on file   Occupational History    Not on file   Social Needs    Financial resource strain: Not on file    Food insecurity     Worry: Not on file     Inability: Not on file    Transportation needs     Medical: Not on file     Non-medical: Not on file   Tobacco Use    Smoking status: Former Smoker     Quit date: 3/26/2018     Years since quittin.3    Smokeless tobacco: Never Used   Substance and Sexual Activity    Alcohol use: No    Drug use: No    Sexual activity: Yes     Partners: Female   Lifestyle    Physical activity     Days per week: Not on file     Minutes per session: Not on file    Stress: Not on file   Relationships    Social connections     Talks on phone: Not on file     Gets together: Not on file     Attends Zoroastrianism service: Not on file      "Active member of club or organization: Not on file     Attends meetings of clubs or organizations: Not on file     Relationship status: Not on file   Other Topics Concern    Not on file   Social History Narrative    Not on file     Outpatient Encounter Medications as of 8/4/2020   Medication Sig Dispense Refill    aspirin (ECOTRIN) 81 MG EC tablet Take 81 mg by mouth.      candesartan-hydrochlorothiazide (ATACAND HCT) 16-12.5 mg per tablet TAKE 1 TABLET BY MOUTH EVERY DAY 90 tablet 1    omega 3-dha-epa-fish oil 900-1,400 mg CpDR Take by mouth once daily.       zolpidem (AMBIEN) 10 mg Tab Take 1 tablet (10 mg total) by mouth nightly as needed. 30 tablet 5    fexofenadine (ALLEGRA) 180 MG tablet Take 1 tablet (180 mg total) by mouth once daily. 30 tablet 0    nitroGLYCERIN (NITROSTAT) 0.4 MG SL tablet Place 1 tablet (0.4 mg total) under the tongue every 5 (five) minutes as needed. 30 tablet 0     No facility-administered encounter medications on file as of 8/4/2020.        Review of Systems   Constitutional: Negative for appetite change and fever.   HENT: Negative for congestion, facial swelling and voice change.    Eyes: Negative for discharge and itching.   Respiratory: Negative for cough, chest tightness and wheezing.    Cardiovascular: Negative.  Negative for chest pain, palpitations and leg swelling.   Gastrointestinal: Negative for abdominal pain, nausea and vomiting.   Endocrine: Negative for cold intolerance and heat intolerance.   Genitourinary: Negative for dysuria and flank pain.   Musculoskeletal: Negative for myalgias and neck stiffness.   Skin: Negative for pallor and rash.   Neurological: Negative for facial asymmetry and weakness.   Psychiatric/Behavioral: Negative for agitation and confusion.       Objective:      /64 (BP Location: Right arm, Patient Position: Sitting, BP Method: Large (Manual))   Pulse 76   Temp 98.1 °F (36.7 °C) (Temporal)   Ht 5' 10" (1.778 m)   Wt 105.7 kg (232 " lb 14.7 oz)   SpO2 96%   BMI 33.42 kg/m²   Physical Exam  Vitals signs and nursing note reviewed.   Constitutional:       General: He is not in acute distress.     Appearance: Normal appearance. He is well-developed.   HENT:      Head: Normocephalic and atraumatic.      Right Ear: External ear normal.      Left Ear: External ear normal.      Nose: No congestion.   Eyes:      Conjunctiva/sclera: Conjunctivae normal.   Neck:      Musculoskeletal: Neck supple.   Cardiovascular:      Rate and Rhythm: Normal rate and regular rhythm.      Heart sounds: No murmur.   Pulmonary:      Effort: Pulmonary effort is normal. No respiratory distress.      Breath sounds: Normal breath sounds.   Abdominal:      General: Abdomen is flat.      Palpations: Abdomen is soft.   Genitourinary:     Comments: deferred  Musculoskeletal: Normal range of motion.         General: No swelling or tenderness.   Skin:     General: Skin is warm and dry.   Neurological:      General: No focal deficit present.      Mental Status: He is alert and oriented to person, place, and time.   Psychiatric:         Mood and Affect: Mood normal.         Behavior: Behavior normal.         Thought Content: Thought content normal.         Results for orders placed or performed in visit on 05/31/19   Lipid panel   Result Value Ref Range    Cholesterol 189 120 - 199 mg/dL    Triglycerides 149 30 - 150 mg/dL    HDL 40 40 - 75 mg/dL    LDL Cholesterol 119.2 63.0 - 159.0 mg/dL    Hdl/Cholesterol Ratio 21.2 20.0 - 50.0 %    Total Cholesterol/HDL Ratio 4.7 2.0 - 5.0    Non-HDL Cholesterol 149 mg/dL     Assessment:       1. Annual visit for general adult medical examination with abnormal findings    2. ELI (obstructive sleep apnea)    3. Prostate cancer screening    4. Colon cancer screening    5. Primary insomnia    6. Obesity (BMI 30.0-34.9)    7. Essential hypertension    8. BMI 34.0-34.9,adult    9. Encounter for hepatitis C screening test for low risk patient         Plan:   Annual visit for general adult medical examination with abnormal findings: doing well benign exam  -     CBC auto differential; Future; Expected date: 08/04/2020  -     Comprehensive metabolic panel; Future; Expected date: 08/04/2020  -     Lipid Panel; Future; Expected date: 08/04/2020  -     TSH; Future; Expected date: 08/04/2020  -     Hemoglobin A1C; Future; Expected date: 08/04/2020    ELI (obstructive sleep apnea)    Prostate cancer screening  -     PSA, Screening; Future; Expected date: 08/04/2020    Colon cancer screening  -     Fecal Immunochemical Test (iFOBT); Future; Expected date: 08/04/2020    Primary insomnia    Obesity (BMI 30.0-34.9)    Essential hypertension    BMI 34.0-34.9,adult    Encounter for hepatitis C screening test for low risk patient  -     Hepatitis C Antibody; Future; Expected date: 08/04/2020

## 2020-08-04 NOTE — PATIENT INSTRUCTIONS

## 2020-08-05 LAB
ESTIMATED AVG GLUCOSE: 117 MG/DL (ref 68–131)
HBA1C MFR BLD HPLC: 5.7 % (ref 4–5.6)
HCV AB SERPL QL IA: NEGATIVE

## 2020-08-06 ENCOUNTER — LAB VISIT (OUTPATIENT)
Dept: LAB | Facility: HOSPITAL | Age: 50
End: 2020-08-06
Attending: FAMILY MEDICINE
Payer: COMMERCIAL

## 2020-08-06 DIAGNOSIS — Z12.11 COLON CANCER SCREENING: ICD-10-CM

## 2020-08-06 PROCEDURE — 82274 ASSAY TEST FOR BLOOD FECAL: CPT

## 2020-08-19 LAB — HEMOCCULT STL QL IA: NEGATIVE

## 2020-11-03 DIAGNOSIS — I10 HTN, GOAL BELOW 130/80: ICD-10-CM

## 2020-11-03 RX ORDER — CANDESARTAN CILEXETIL AND HYDROCHLOROTHIAZIDE 16; 12.5 MG/1; MG/1
1 TABLET ORAL DAILY
Qty: 90 TABLET | Refills: 1 | Status: SHIPPED | OUTPATIENT
Start: 2020-11-03 | End: 2021-04-30

## 2020-12-03 RX ORDER — ZOLPIDEM TARTRATE 10 MG/1
10 TABLET ORAL NIGHTLY PRN
Qty: 30 TABLET | Refills: 0 | OUTPATIENT
Start: 2020-12-03 | End: 2021-01-02

## 2020-12-07 RX ORDER — ZOLPIDEM TARTRATE 10 MG/1
10 TABLET ORAL NIGHTLY PRN
Qty: 30 TABLET | Refills: 5 | OUTPATIENT
Start: 2020-12-07

## 2020-12-08 ENCOUNTER — OFFICE VISIT (OUTPATIENT)
Dept: FAMILY MEDICINE | Facility: CLINIC | Age: 50
End: 2020-12-08
Payer: COMMERCIAL

## 2020-12-08 DIAGNOSIS — G47.00 INSOMNIA, CONTROLLED: Primary | ICD-10-CM

## 2020-12-08 PROCEDURE — 99213 PR OFFICE/OUTPT VISIT, EST, LEVL III, 20-29 MIN: ICD-10-PCS | Mod: 95,,, | Performed by: FAMILY MEDICINE

## 2020-12-08 PROCEDURE — 99213 OFFICE O/P EST LOW 20 MIN: CPT | Mod: 95,,, | Performed by: FAMILY MEDICINE

## 2020-12-08 RX ORDER — ZOLPIDEM TARTRATE 10 MG/1
10 TABLET ORAL NIGHTLY PRN
Qty: 30 TABLET | Refills: 5 | Status: SHIPPED | OUTPATIENT
Start: 2020-12-08 | End: 2021-03-22

## 2020-12-08 NOTE — PATIENT INSTRUCTIONS
Insomnia  Insomnia is repeated difficulty going to sleep or staying asleep, or both. Whether you have insomnia is not defined by a specific amount of sleep. Different people need different amounts of sleep, and you may need more or less sleep at different times of your life.  There are 3 major types of insomnia:  short-term, chronic, and other.  Short-term, or acute insomnia lasts less than 3 months.  The symptoms are temporary and can be linked directly to a stressor, such as the death of a loved one, financial problems, or a new physical problem.  Short-term insomnia stops when the stressor resolves or the person adapts to its presence.  Chronic insomnia occurs at least 3 times a week and lasts longer than 3 months.  Chronic insomnia can occur when either the cause of the sleeping problem is not clear, or the insomnia does not get better when the stressor is resolved. A number of other criteria are also used to make the diagnosis of chronic insomnia.   Other insomnia is the third type of insomnia-related sleep disorders.  This description applies to people who have problems getting to sleep or staying asleep, but do not meet all of the factors that describe either short-term or chronic insomnia.    Many things cause insomnia. Different people may have different causes. It can be from an underlying medical or psychological condition, or lifestyle. It can also be primary insomnia, which means no cause can be found.  Causes of insomnia include:  · Chronic medical problems- heart disease, gastrointestinal problems, hormonal changes, breathing problems  · Anxiety  · Stress  · Depression  · Pain  · Work schedule  · Sleep apnea  · Illegal drugs  · Certain medicines  Many different medidcines can affect your sleep, such as stimulants, caffeine, alcohol, some decongestants, and diet pills. Other medicines may include some types of blood pressure pills, steroids, asthma medicines, antihistamines, antidepressants,  seizure medicines and statins. Not all of these will affect your sleep, and they shouldnt be stopped without talking to your doctor.  Symptoms of insomnia can include:  · Lying awake for long periods at night before falling asleep  · Waking up several times during the night  · Waking up early in the morning and not being able to get back to sleep  · Feeling tired and not refreshed by sleep  · Not being able to function properly during the day and finding it hard to concentrate  · Irritability  · Tiredness and fatigue during the day  Home care  1. Review your medicines with your doctor or pharmacist to find out if they can cause insomnia. Not all medicines will affect your sleep, but they shouldn't be stopped without reviewing them with your doctor. There may be serious side effects and consequences from suddenly stopping your medicines. Not taking them may cause strokes, heart attacks, and many other problems.  2. Caffeine, smoking and alcohol also affect sleep. Limit your daily use and do not use these before bedtime. Alcohol may make you sleepy at first, but as its effects wear off, you may awaken a few hours later and have trouble returning to sleep.  3. Do not exercise, eat or drink large amounts of liquid within 2 hours of your bedtime.  4. Improve your sleep habits. Have a fixed bed and wake-up time. Try to keep noise, light and heat in your bedroom at a comfortable level. Try using earplugs or eyeshades if needed.   5. Avoid watching TV in bed.  6. If you do not fall asleep within 30 minutes, try to relax by reading or listening to soft music.  7. Limit daytime napping to one 30 minute period, early in the day.  8. Get regular exercise. Find other ways to lessen your stress level.  9. If a medicine was prescribed to help reset your sleep patterns, take it as directed. Sleeping pills are intended for short-term use, only. If taken for too long, the effect wears off while the risk of physical addiction and  psychological dependence increases.  Sleep diary  If the cause isnt obvious and it is not improving, try keeping a sleep diary for a couple of weeks. Include in it:  · The time you go to bed  · How long it takes to fall asleep  · How many times you wake up  · What time you wake up  · Your meal times and what you eat  · What time you drink alcohol  · Your exercise habits and times  Follow-up care  Follow up with your healthcare provider, or as advised. If X-rays or CT scans were done, you will be notified if there is a change in the reading, especially if it affects treatment.  Call 911  Call 911 if any of these occur:  · Trouble breathing  · Confusion or trouble waking  · Fainting or loss of consciousness  · Rapid heart rate  · New chest, arm, shoulder, neck or upper back pain  · Trouble with speech or vision, weakness of an arm or leg  · Trouble walking or talking, loss of balance, numbness or weakness in one side of your body, facial droop  When to seek medical advice  Call your healthcare provider right away if any of these occur:  · Extreme restlessness or irritability  · Confusion or hallucinations (seeing or hearing things that are not there)  · Anxiety, depression  · Several days without sleeping  Date Last Reviewed: 11/19/2015  © 1054-1535 [employer] [address] [place] [address], [place] [address]. All rights reserved. This information is not intended as a substitute for professional medical care. Always follow your healthcare professional's instructions.

## 2020-12-08 NOTE — PROGRESS NOTES
Subjective:    The patient location is: [place] at home  Visit type: Virtual visit with synchronous audio and video  Total time spent with patient:  Each patient to whom he or she provides medical services by telemedicine is:  (1) informed of the relationship between the physician and patient and the respective role of any other health care provider with respect to management of the patient; and (2) notified that he or she may decline to receive medical services by telemedicine and may withdraw from such care at any time.       Patient ID: Nehemias Zhang is a 50 y.o. male.    Chief Complaint: Insomnia      History of Present Illness:   Nehemias Zhang 50 y.o. male presents today with   Insomnia: chronic stable and controled on Ambien 10 mg. Denies side effect. Needs refill today.    Past Medical History:   Diagnosis Date    Hyperlipidemia     Hypertension     Insomnia      Family History   Problem Relation Age of Onset    Heart disease Mother     Prostate cancer Brother     Colon cancer Maternal Grandfather     Colon cancer Paternal Grandfather      Social History     Socioeconomic History    Marital status: Single     Spouse name: Not on file    Number of children: Not on file    Years of education: Not on file    Highest education level: Not on file   Occupational History    Not on file   Social Needs    Financial resource strain: Not on file    Food insecurity     Worry: Not on file     Inability: Not on file    Transportation needs     Medical: Not on file     Non-medical: Not on file   Tobacco Use    Smoking status: Former Smoker     Quit date: 3/26/2018     Years since quittin.7    Smokeless tobacco: Never Used   Substance and Sexual Activity    Alcohol use: No    Drug use: No    Sexual activity: Yes     Partners: Female   Lifestyle    Physical activity     Days per week: Not on file     Minutes per session: Not on file    Stress: Not on file   Relationships    Social  connections     Talks on phone: Not on file     Gets together: Not on file     Attends Sabianism service: Not on file     Active member of club or organization: Not on file     Attends meetings of clubs or organizations: Not on file     Relationship status: Not on file   Other Topics Concern    Not on file   Social History Narrative    Not on file     Outpatient Encounter Medications as of 12/8/2020   Medication Sig Dispense Refill    aspirin (ECOTRIN) 81 MG EC tablet Take 81 mg by mouth.      candesartan-hydrochlorothiazide (ATACAND HCT) 16-12.5 mg per tablet Take 1 tablet by mouth once daily. 90 tablet 1    fexofenadine (ALLEGRA) 180 MG tablet Take 1 tablet (180 mg total) by mouth once daily. 30 tablet 0    nitroGLYCERIN (NITROSTAT) 0.4 MG SL tablet Place 1 tablet (0.4 mg total) under the tongue every 5 (five) minutes as needed. 30 tablet 0    omega 3-dha-epa-fish oil 900-1,400 mg CpDR Take by mouth once daily.       zolpidem (AMBIEN) 10 mg Tab Take 1 tablet (10 mg total) by mouth nightly as needed. 30 tablet 5    [DISCONTINUED] zolpidem (AMBIEN) 10 mg Tab Take 1 tablet (10 mg total) by mouth nightly as needed. 30 tablet 5     No facility-administered encounter medications on file as of 12/8/2020.        Review of Systems   Constitutional: Negative for activity change and unexpected weight change.   HENT: Negative for hearing loss, rhinorrhea and trouble swallowing.    Eyes: Negative for discharge and visual disturbance.   Respiratory: Negative for chest tightness and wheezing.    Cardiovascular: Negative for chest pain and palpitations.   Gastrointestinal: Negative for blood in stool, constipation, diarrhea and vomiting.   Endocrine: Negative for polydipsia and polyuria.   Genitourinary: Negative for difficulty urinating, hematuria and urgency.   Musculoskeletal: Negative for arthralgias, joint swelling and neck pain.   Neurological: Negative for headaches.   Psychiatric/Behavioral: Positive for sleep  disturbance. Negative for confusion, dysphoric mood and suicidal ideas. The patient is not nervous/anxious.        Objective:      There were no vitals taken for this visit.  Physical Exam      Assessment:       1. Insomnia, controlled        Plan:   Insomnia, controlled  -     zolpidem (AMBIEN) 10 mg Tab; Take 1 tablet (10 mg total) by mouth nightly as needed.  Dispense: 30 tablet; Refill: 5

## 2021-03-22 ENCOUNTER — OFFICE VISIT (OUTPATIENT)
Dept: FAMILY MEDICINE | Facility: CLINIC | Age: 51
End: 2021-03-22
Payer: COMMERCIAL

## 2021-03-22 ENCOUNTER — APPOINTMENT (OUTPATIENT)
Dept: LAB | Facility: HOSPITAL | Age: 51
End: 2021-03-22
Attending: FAMILY MEDICINE
Payer: COMMERCIAL

## 2021-03-22 VITALS
DIASTOLIC BLOOD PRESSURE: 90 MMHG | OXYGEN SATURATION: 97 % | SYSTOLIC BLOOD PRESSURE: 138 MMHG | TEMPERATURE: 98 F | BODY MASS INDEX: 33.49 KG/M2 | HEART RATE: 85 BPM | HEIGHT: 70 IN | WEIGHT: 233.94 LBS

## 2021-03-22 DIAGNOSIS — E78.2 MIXED HYPERLIPIDEMIA: ICD-10-CM

## 2021-03-22 DIAGNOSIS — G47.00 INSOMNIA, CONTROLLED: ICD-10-CM

## 2021-03-22 DIAGNOSIS — R53.82 CHRONIC FATIGUE: Primary | ICD-10-CM

## 2021-03-22 DIAGNOSIS — R53.82 CHRONIC FATIGUE: ICD-10-CM

## 2021-03-22 LAB
BASOPHILS # BLD AUTO: 0.06 K/UL (ref 0–0.2)
BASOPHILS NFR BLD: 1 % (ref 0–1.9)
BILIRUB UR QL STRIP: NEGATIVE
CLARITY UR REFRACT.AUTO: CLEAR
COLOR UR AUTO: NORMAL
DIFFERENTIAL METHOD: ABNORMAL
EOSINOPHIL # BLD AUTO: 0.1 K/UL (ref 0–0.5)
EOSINOPHIL NFR BLD: 0.8 % (ref 0–8)
ERYTHROCYTE [DISTWIDTH] IN BLOOD BY AUTOMATED COUNT: 12.2 % (ref 11.5–14.5)
ERYTHROCYTE [SEDIMENTATION RATE] IN BLOOD BY WESTERGREN METHOD: 2 MM/HR (ref 0–10)
GLUCOSE UR QL STRIP: NEGATIVE
HCT VFR BLD AUTO: 45.2 % (ref 40–54)
HGB BLD-MCNC: 15.3 G/DL (ref 14–18)
HGB UR QL STRIP: NEGATIVE
IMM GRANULOCYTES # BLD AUTO: 0.09 K/UL (ref 0–0.04)
IMM GRANULOCYTES NFR BLD AUTO: 1.5 % (ref 0–0.5)
KETONES UR QL STRIP: NEGATIVE
LEUKOCYTE ESTERASE UR QL STRIP: NEGATIVE
LYMPHOCYTES # BLD AUTO: 1.7 K/UL (ref 1–4.8)
LYMPHOCYTES NFR BLD: 28.6 % (ref 18–48)
MCH RBC QN AUTO: 31.6 PG (ref 27–31)
MCHC RBC AUTO-ENTMCNC: 33.8 G/DL (ref 32–36)
MCV RBC AUTO: 93 FL (ref 82–98)
MONOCYTES # BLD AUTO: 0.7 K/UL (ref 0.3–1)
MONOCYTES NFR BLD: 11.7 % (ref 4–15)
NEUTROPHILS # BLD AUTO: 3.4 K/UL (ref 1.8–7.7)
NEUTROPHILS NFR BLD: 56.4 % (ref 38–73)
NITRITE UR QL STRIP: NEGATIVE
NRBC BLD-RTO: 0 /100 WBC
PH UR STRIP: 7 [PH] (ref 5–8)
PLATELET # BLD AUTO: 368 K/UL (ref 150–350)
PMV BLD AUTO: 9.2 FL (ref 9.2–12.9)
PROT UR QL STRIP: NEGATIVE
RBC # BLD AUTO: 4.84 M/UL (ref 4.6–6.2)
SP GR UR STRIP: 1 (ref 1–1.03)
URN SPEC COLLECT METH UR: NORMAL
WBC # BLD AUTO: 6.05 K/UL (ref 3.9–12.7)

## 2021-03-22 PROCEDURE — 85025 COMPLETE CBC W/AUTO DIFF WBC: CPT | Performed by: FAMILY MEDICINE

## 2021-03-22 PROCEDURE — 1126F PR PAIN SEVERITY QUANTIFIED, NO PAIN PRESENT: ICD-10-PCS | Mod: S$GLB,,, | Performed by: FAMILY MEDICINE

## 2021-03-22 PROCEDURE — 99999 PR PBB SHADOW E&M-EST. PATIENT-LVL IV: CPT | Mod: PBBFAC,,, | Performed by: FAMILY MEDICINE

## 2021-03-22 PROCEDURE — 99214 PR OFFICE/OUTPT VISIT, EST, LEVL IV, 30-39 MIN: ICD-10-PCS | Mod: S$GLB,,, | Performed by: FAMILY MEDICINE

## 2021-03-22 PROCEDURE — 85651 RBC SED RATE NONAUTOMATED: CPT | Performed by: FAMILY MEDICINE

## 2021-03-22 PROCEDURE — 80053 COMPREHEN METABOLIC PANEL: CPT | Performed by: FAMILY MEDICINE

## 2021-03-22 PROCEDURE — 81003 URINALYSIS AUTO W/O SCOPE: CPT | Performed by: FAMILY MEDICINE

## 2021-03-22 PROCEDURE — 80061 LIPID PANEL: CPT | Performed by: FAMILY MEDICINE

## 2021-03-22 PROCEDURE — 3008F BODY MASS INDEX DOCD: CPT | Mod: CPTII,S$GLB,, | Performed by: FAMILY MEDICINE

## 2021-03-22 PROCEDURE — 99999 PR PBB SHADOW E&M-EST. PATIENT-LVL IV: ICD-10-PCS | Mod: PBBFAC,,, | Performed by: FAMILY MEDICINE

## 2021-03-22 PROCEDURE — 3080F DIAST BP >= 90 MM HG: CPT | Mod: CPTII,S$GLB,, | Performed by: FAMILY MEDICINE

## 2021-03-22 PROCEDURE — 84443 ASSAY THYROID STIM HORMONE: CPT | Performed by: FAMILY MEDICINE

## 2021-03-22 PROCEDURE — 1126F AMNT PAIN NOTED NONE PRSNT: CPT | Mod: S$GLB,,, | Performed by: FAMILY MEDICINE

## 2021-03-22 PROCEDURE — 83615 LACTATE (LD) (LDH) ENZYME: CPT | Performed by: FAMILY MEDICINE

## 2021-03-22 PROCEDURE — 3075F PR MOST RECENT SYSTOLIC BLOOD PRESS GE 130-139MM HG: ICD-10-PCS | Mod: CPTII,S$GLB,, | Performed by: FAMILY MEDICINE

## 2021-03-22 PROCEDURE — 99214 OFFICE O/P EST MOD 30 MIN: CPT | Mod: S$GLB,,, | Performed by: FAMILY MEDICINE

## 2021-03-22 PROCEDURE — 84550 ASSAY OF BLOOD/URIC ACID: CPT | Performed by: FAMILY MEDICINE

## 2021-03-22 PROCEDURE — 3080F PR MOST RECENT DIASTOLIC BLOOD PRESSURE >= 90 MM HG: ICD-10-PCS | Mod: CPTII,S$GLB,, | Performed by: FAMILY MEDICINE

## 2021-03-22 PROCEDURE — 3075F SYST BP GE 130 - 139MM HG: CPT | Mod: CPTII,S$GLB,, | Performed by: FAMILY MEDICINE

## 2021-03-22 PROCEDURE — 36415 COLL VENOUS BLD VENIPUNCTURE: CPT | Mod: PO | Performed by: FAMILY MEDICINE

## 2021-03-22 PROCEDURE — 3008F PR BODY MASS INDEX (BMI) DOCUMENTED: ICD-10-PCS | Mod: CPTII,S$GLB,, | Performed by: FAMILY MEDICINE

## 2021-03-22 RX ORDER — ZOLPIDEM TARTRATE 10 MG/1
10 TABLET ORAL NIGHTLY PRN
Qty: 30 TABLET | Refills: 3
Start: 2021-03-22 | End: 2021-07-20 | Stop reason: SDUPTHER

## 2021-03-23 LAB
ALBUMIN SERPL BCP-MCNC: 4.1 G/DL (ref 3.5–5.2)
ALP SERPL-CCNC: 49 U/L (ref 55–135)
ALT SERPL W/O P-5'-P-CCNC: 27 U/L (ref 10–44)
ANION GAP SERPL CALC-SCNC: 7 MMOL/L (ref 8–16)
AST SERPL-CCNC: 18 U/L (ref 10–40)
BILIRUB SERPL-MCNC: 0.5 MG/DL (ref 0.1–1)
BUN SERPL-MCNC: 11 MG/DL (ref 6–20)
CALCIUM SERPL-MCNC: 9.4 MG/DL (ref 8.7–10.5)
CHLORIDE SERPL-SCNC: 103 MMOL/L (ref 95–110)
CHOLEST SERPL-MCNC: 210 MG/DL (ref 120–199)
CHOLEST/HDLC SERPL: 5 {RATIO} (ref 2–5)
CO2 SERPL-SCNC: 27 MMOL/L (ref 23–29)
CREAT SERPL-MCNC: 0.9 MG/DL (ref 0.5–1.4)
EST. GFR  (AFRICAN AMERICAN): >60 ML/MIN/1.73 M^2
EST. GFR  (NON AFRICAN AMERICAN): >60 ML/MIN/1.73 M^2
GLUCOSE SERPL-MCNC: 103 MG/DL (ref 70–110)
HDLC SERPL-MCNC: 42 MG/DL (ref 40–75)
HDLC SERPL: 20 % (ref 20–50)
LDH SERPL L TO P-CCNC: 125 U/L (ref 110–260)
LDLC SERPL CALC-MCNC: 128 MG/DL (ref 63–159)
NONHDLC SERPL-MCNC: 168 MG/DL
POTASSIUM SERPL-SCNC: 3.9 MMOL/L (ref 3.5–5.1)
PROT SERPL-MCNC: 7.3 G/DL (ref 6–8.4)
SODIUM SERPL-SCNC: 137 MMOL/L (ref 136–145)
TRIGL SERPL-MCNC: 200 MG/DL (ref 30–150)
TSH SERPL DL<=0.005 MIU/L-ACNC: 1.09 UIU/ML (ref 0.4–4)
URATE SERPL-MCNC: 6.3 MG/DL (ref 3.4–7)

## 2021-04-01 ENCOUNTER — OFFICE VISIT (OUTPATIENT)
Dept: FAMILY MEDICINE | Facility: CLINIC | Age: 51
End: 2021-04-01
Payer: COMMERCIAL

## 2021-04-01 VITALS
TEMPERATURE: 97 F | HEART RATE: 80 BPM | OXYGEN SATURATION: 97 % | HEIGHT: 70 IN | WEIGHT: 234.25 LBS | DIASTOLIC BLOOD PRESSURE: 82 MMHG | BODY MASS INDEX: 33.53 KG/M2 | SYSTOLIC BLOOD PRESSURE: 138 MMHG

## 2021-04-01 DIAGNOSIS — R53.82 CHRONIC FATIGUE: Primary | ICD-10-CM

## 2021-04-01 DIAGNOSIS — E78.2 MIXED HYPERLIPIDEMIA: ICD-10-CM

## 2021-04-01 DIAGNOSIS — G47.00 INSOMNIA, CONTROLLED: ICD-10-CM

## 2021-04-01 PROCEDURE — 99999 PR PBB SHADOW E&M-EST. PATIENT-LVL III: ICD-10-PCS | Mod: PBBFAC,,, | Performed by: FAMILY MEDICINE

## 2021-04-01 PROCEDURE — 3008F BODY MASS INDEX DOCD: CPT | Mod: CPTII,S$GLB,, | Performed by: FAMILY MEDICINE

## 2021-04-01 PROCEDURE — 99999 PR PBB SHADOW E&M-EST. PATIENT-LVL III: CPT | Mod: PBBFAC,,, | Performed by: FAMILY MEDICINE

## 2021-04-01 PROCEDURE — 99213 OFFICE O/P EST LOW 20 MIN: CPT | Mod: S$GLB,,, | Performed by: FAMILY MEDICINE

## 2021-04-01 PROCEDURE — 3079F PR MOST RECENT DIASTOLIC BLOOD PRESSURE 80-89 MM HG: ICD-10-PCS | Mod: CPTII,S$GLB,, | Performed by: FAMILY MEDICINE

## 2021-04-01 PROCEDURE — 1126F PR PAIN SEVERITY QUANTIFIED, NO PAIN PRESENT: ICD-10-PCS | Mod: S$GLB,,, | Performed by: FAMILY MEDICINE

## 2021-04-01 PROCEDURE — 1126F AMNT PAIN NOTED NONE PRSNT: CPT | Mod: S$GLB,,, | Performed by: FAMILY MEDICINE

## 2021-04-01 PROCEDURE — 3075F SYST BP GE 130 - 139MM HG: CPT | Mod: CPTII,S$GLB,, | Performed by: FAMILY MEDICINE

## 2021-04-01 PROCEDURE — 99213 PR OFFICE/OUTPT VISIT, EST, LEVL III, 20-29 MIN: ICD-10-PCS | Mod: S$GLB,,, | Performed by: FAMILY MEDICINE

## 2021-04-01 PROCEDURE — 3079F DIAST BP 80-89 MM HG: CPT | Mod: CPTII,S$GLB,, | Performed by: FAMILY MEDICINE

## 2021-04-01 PROCEDURE — 3008F PR BODY MASS INDEX (BMI) DOCUMENTED: ICD-10-PCS | Mod: CPTII,S$GLB,, | Performed by: FAMILY MEDICINE

## 2021-04-01 PROCEDURE — 3075F PR MOST RECENT SYSTOLIC BLOOD PRESS GE 130-139MM HG: ICD-10-PCS | Mod: CPTII,S$GLB,, | Performed by: FAMILY MEDICINE

## 2021-07-20 ENCOUNTER — LAB VISIT (OUTPATIENT)
Dept: LAB | Facility: HOSPITAL | Age: 51
End: 2021-07-20
Attending: FAMILY MEDICINE
Payer: COMMERCIAL

## 2021-07-20 ENCOUNTER — OFFICE VISIT (OUTPATIENT)
Dept: FAMILY MEDICINE | Facility: CLINIC | Age: 51
End: 2021-07-20
Payer: COMMERCIAL

## 2021-07-20 VITALS
HEIGHT: 70 IN | OXYGEN SATURATION: 98 % | WEIGHT: 224.31 LBS | BODY MASS INDEX: 32.11 KG/M2 | TEMPERATURE: 99 F | DIASTOLIC BLOOD PRESSURE: 82 MMHG | SYSTOLIC BLOOD PRESSURE: 120 MMHG | HEART RATE: 78 BPM

## 2021-07-20 DIAGNOSIS — R53.83 FATIGUE, UNSPECIFIED TYPE: ICD-10-CM

## 2021-07-20 DIAGNOSIS — M25.512 ACUTE PAIN OF LEFT SHOULDER: ICD-10-CM

## 2021-07-20 DIAGNOSIS — M25.512 ACUTE PAIN OF LEFT SHOULDER: Primary | ICD-10-CM

## 2021-07-20 DIAGNOSIS — I10 ESSENTIAL HYPERTENSION: ICD-10-CM

## 2021-07-20 DIAGNOSIS — G47.00 INSOMNIA, CONTROLLED: ICD-10-CM

## 2021-07-20 PROCEDURE — 93010 EKG 12-LEAD: ICD-10-PCS | Mod: S$GLB,,, | Performed by: INTERNAL MEDICINE

## 2021-07-20 PROCEDURE — 93010 ELECTROCARDIOGRAM REPORT: CPT | Mod: S$GLB,,, | Performed by: INTERNAL MEDICINE

## 2021-07-20 PROCEDURE — 86140 C-REACTIVE PROTEIN: CPT | Performed by: FAMILY MEDICINE

## 2021-07-20 PROCEDURE — 99214 PR OFFICE/OUTPT VISIT, EST, LEVL IV, 30-39 MIN: ICD-10-PCS | Mod: S$GLB,,, | Performed by: FAMILY MEDICINE

## 2021-07-20 PROCEDURE — 99999 PR PBB SHADOW E&M-EST. PATIENT-LVL III: CPT | Mod: PBBFAC,,, | Performed by: FAMILY MEDICINE

## 2021-07-20 PROCEDURE — 84484 ASSAY OF TROPONIN QUANT: CPT | Performed by: FAMILY MEDICINE

## 2021-07-20 PROCEDURE — 93005 EKG 12-LEAD: ICD-10-PCS | Mod: S$GLB,,, | Performed by: FAMILY MEDICINE

## 2021-07-20 PROCEDURE — 3079F PR MOST RECENT DIASTOLIC BLOOD PRESSURE 80-89 MM HG: ICD-10-PCS | Mod: CPTII,S$GLB,, | Performed by: FAMILY MEDICINE

## 2021-07-20 PROCEDURE — 84443 ASSAY THYROID STIM HORMONE: CPT | Performed by: FAMILY MEDICINE

## 2021-07-20 PROCEDURE — 80053 COMPREHEN METABOLIC PANEL: CPT | Performed by: FAMILY MEDICINE

## 2021-07-20 PROCEDURE — 1126F PR PAIN SEVERITY QUANTIFIED, NO PAIN PRESENT: ICD-10-PCS | Mod: CPTII,S$GLB,, | Performed by: FAMILY MEDICINE

## 2021-07-20 PROCEDURE — 3074F SYST BP LT 130 MM HG: CPT | Mod: CPTII,S$GLB,, | Performed by: FAMILY MEDICINE

## 2021-07-20 PROCEDURE — 86769 SARS-COV-2 COVID-19 ANTIBODY: CPT | Performed by: FAMILY MEDICINE

## 2021-07-20 PROCEDURE — 3008F BODY MASS INDEX DOCD: CPT | Mod: CPTII,S$GLB,, | Performed by: FAMILY MEDICINE

## 2021-07-20 PROCEDURE — 3074F PR MOST RECENT SYSTOLIC BLOOD PRESSURE < 130 MM HG: ICD-10-PCS | Mod: CPTII,S$GLB,, | Performed by: FAMILY MEDICINE

## 2021-07-20 PROCEDURE — 3079F DIAST BP 80-89 MM HG: CPT | Mod: CPTII,S$GLB,, | Performed by: FAMILY MEDICINE

## 2021-07-20 PROCEDURE — 82550 ASSAY OF CK (CPK): CPT | Performed by: FAMILY MEDICINE

## 2021-07-20 PROCEDURE — 93005 ELECTROCARDIOGRAM TRACING: CPT | Mod: S$GLB,,, | Performed by: FAMILY MEDICINE

## 2021-07-20 PROCEDURE — 36415 COLL VENOUS BLD VENIPUNCTURE: CPT | Mod: PO | Performed by: FAMILY MEDICINE

## 2021-07-20 PROCEDURE — 3008F PR BODY MASS INDEX (BMI) DOCUMENTED: ICD-10-PCS | Mod: CPTII,S$GLB,, | Performed by: FAMILY MEDICINE

## 2021-07-20 PROCEDURE — 85025 COMPLETE CBC W/AUTO DIFF WBC: CPT | Performed by: FAMILY MEDICINE

## 2021-07-20 PROCEDURE — 99999 PR PBB SHADOW E&M-EST. PATIENT-LVL III: ICD-10-PCS | Mod: PBBFAC,,, | Performed by: FAMILY MEDICINE

## 2021-07-20 PROCEDURE — 1126F AMNT PAIN NOTED NONE PRSNT: CPT | Mod: CPTII,S$GLB,, | Performed by: FAMILY MEDICINE

## 2021-07-20 PROCEDURE — 99214 OFFICE O/P EST MOD 30 MIN: CPT | Mod: S$GLB,,, | Performed by: FAMILY MEDICINE

## 2021-07-20 RX ORDER — ZOLPIDEM TARTRATE 10 MG/1
10 TABLET ORAL NIGHTLY PRN
Qty: 30 TABLET | Refills: 3 | Status: SHIPPED | OUTPATIENT
Start: 2021-07-20 | End: 2022-01-25 | Stop reason: SDUPTHER

## 2021-07-21 ENCOUNTER — PATIENT MESSAGE (OUTPATIENT)
Dept: FAMILY MEDICINE | Facility: CLINIC | Age: 51
End: 2021-07-21

## 2021-07-21 LAB
ALBUMIN SERPL BCP-MCNC: 4.2 G/DL (ref 3.5–5.2)
ALP SERPL-CCNC: 54 U/L (ref 55–135)
ALT SERPL W/O P-5'-P-CCNC: 28 U/L (ref 10–44)
ANION GAP SERPL CALC-SCNC: 11 MMOL/L (ref 8–16)
AST SERPL-CCNC: 18 U/L (ref 10–40)
BASOPHILS # BLD AUTO: 0.06 K/UL (ref 0–0.2)
BASOPHILS NFR BLD: 0.7 % (ref 0–1.9)
BILIRUB SERPL-MCNC: 0.4 MG/DL (ref 0.1–1)
BUN SERPL-MCNC: 10 MG/DL (ref 6–20)
CALCIUM SERPL-MCNC: 9.9 MG/DL (ref 8.7–10.5)
CHLORIDE SERPL-SCNC: 103 MMOL/L (ref 95–110)
CK MB SERPL-MCNC: 0.9 NG/ML (ref 0.1–6.5)
CK MB SERPL-RTO: 1.6 % (ref 0–5)
CK SERPL-CCNC: 57 U/L (ref 20–200)
CO2 SERPL-SCNC: 26 MMOL/L (ref 23–29)
CREAT SERPL-MCNC: 0.9 MG/DL (ref 0.5–1.4)
CRP SERPL-MCNC: 2.7 MG/L (ref 0–8.2)
DIFFERENTIAL METHOD: ABNORMAL
EOSINOPHIL # BLD AUTO: 0.2 K/UL (ref 0–0.5)
EOSINOPHIL NFR BLD: 2 % (ref 0–8)
ERYTHROCYTE [DISTWIDTH] IN BLOOD BY AUTOMATED COUNT: 12.4 % (ref 11.5–14.5)
EST. GFR  (AFRICAN AMERICAN): >60 ML/MIN/1.73 M^2
EST. GFR  (NON AFRICAN AMERICAN): >60 ML/MIN/1.73 M^2
GLUCOSE SERPL-MCNC: 96 MG/DL (ref 70–110)
HCT VFR BLD AUTO: 46.2 % (ref 40–54)
HGB BLD-MCNC: 15.6 G/DL (ref 14–18)
IMM GRANULOCYTES # BLD AUTO: 0.06 K/UL (ref 0–0.04)
IMM GRANULOCYTES NFR BLD AUTO: 0.7 % (ref 0–0.5)
LYMPHOCYTES # BLD AUTO: 2.2 K/UL (ref 1–4.8)
LYMPHOCYTES NFR BLD: 25.7 % (ref 18–48)
MCH RBC QN AUTO: 31.9 PG (ref 27–31)
MCHC RBC AUTO-ENTMCNC: 33.8 G/DL (ref 32–36)
MCV RBC AUTO: 95 FL (ref 82–98)
MONOCYTES # BLD AUTO: 0.9 K/UL (ref 0.3–1)
MONOCYTES NFR BLD: 10.1 % (ref 4–15)
NEUTROPHILS # BLD AUTO: 5.1 K/UL (ref 1.8–7.7)
NEUTROPHILS NFR BLD: 60.8 % (ref 38–73)
NRBC BLD-RTO: 0 /100 WBC
PLATELET # BLD AUTO: 403 K/UL (ref 150–450)
PMV BLD AUTO: 9 FL (ref 9.2–12.9)
POTASSIUM SERPL-SCNC: 3.8 MMOL/L (ref 3.5–5.1)
PROT SERPL-MCNC: 7.8 G/DL (ref 6–8.4)
RBC # BLD AUTO: 4.89 M/UL (ref 4.6–6.2)
SARS-COV-2 IGG SERPL IA-ACNC: 6290.2 AU/ML
SARS-COV-2 IGG SERPL QL IA: POSITIVE
SODIUM SERPL-SCNC: 140 MMOL/L (ref 136–145)
TROPONIN I SERPL DL<=0.01 NG/ML-MCNC: <0.006 NG/ML (ref 0–0.03)
TSH SERPL DL<=0.005 MIU/L-ACNC: 1.66 UIU/ML (ref 0.4–4)
WBC # BLD AUTO: 8.44 K/UL (ref 3.9–12.7)

## 2021-07-23 ENCOUNTER — PATIENT MESSAGE (OUTPATIENT)
Dept: FAMILY MEDICINE | Facility: CLINIC | Age: 51
End: 2021-07-23

## 2021-07-26 ENCOUNTER — OFFICE VISIT (OUTPATIENT)
Dept: CARDIOLOGY | Facility: CLINIC | Age: 51
End: 2021-07-26
Payer: COMMERCIAL

## 2021-07-26 ENCOUNTER — HOSPITAL ENCOUNTER (OUTPATIENT)
Dept: RADIOLOGY | Facility: HOSPITAL | Age: 51
Discharge: HOME | End: 2021-07-26
Attending: INTERNAL MEDICINE
Payer: COMMERCIAL

## 2021-07-26 VITALS
WEIGHT: 228.63 LBS | BODY MASS INDEX: 32.8 KG/M2 | DIASTOLIC BLOOD PRESSURE: 70 MMHG | SYSTOLIC BLOOD PRESSURE: 128 MMHG | RESPIRATION RATE: 15 BRPM | HEART RATE: 68 BPM

## 2021-07-26 DIAGNOSIS — I10 ESSENTIAL HYPERTENSION: ICD-10-CM

## 2021-07-26 DIAGNOSIS — R07.89 OTHER CHEST PAIN: ICD-10-CM

## 2021-07-26 DIAGNOSIS — E66.9 OBESITY (BMI 30.0-34.9): ICD-10-CM

## 2021-07-26 DIAGNOSIS — G47.33 OSA (OBSTRUCTIVE SLEEP APNEA): ICD-10-CM

## 2021-07-26 DIAGNOSIS — E78.2 MIXED HYPERLIPIDEMIA: ICD-10-CM

## 2021-07-26 DIAGNOSIS — R07.89 OTHER CHEST PAIN: Primary | ICD-10-CM

## 2021-07-26 PROCEDURE — 71046 X-RAY EXAM CHEST 2 VIEWS: CPT | Mod: TC

## 2021-07-26 PROCEDURE — 99214 PR OFFICE/OUTPT VISIT, EST, LEVL IV, 30-39 MIN: ICD-10-PCS | Mod: S$GLB,,, | Performed by: INTERNAL MEDICINE

## 2021-07-26 PROCEDURE — 3078F PR MOST RECENT DIASTOLIC BLOOD PRESSURE < 80 MM HG: ICD-10-PCS | Mod: CPTII,S$GLB,, | Performed by: INTERNAL MEDICINE

## 2021-07-26 PROCEDURE — 1159F MED LIST DOCD IN RCRD: CPT | Mod: CPTII,S$GLB,, | Performed by: INTERNAL MEDICINE

## 2021-07-26 PROCEDURE — 71046 X-RAY EXAM CHEST 2 VIEWS: CPT | Mod: 26,,, | Performed by: RADIOLOGY

## 2021-07-26 PROCEDURE — 3074F SYST BP LT 130 MM HG: CPT | Mod: CPTII,S$GLB,, | Performed by: INTERNAL MEDICINE

## 2021-07-26 PROCEDURE — 71046 XR CHEST PA AND LATERAL: ICD-10-PCS | Mod: 26,,, | Performed by: RADIOLOGY

## 2021-07-26 PROCEDURE — 1160F PR REVIEW ALL MEDS BY PRESCRIBER/CLIN PHARMACIST DOCUMENTED: ICD-10-PCS | Mod: CPTII,S$GLB,, | Performed by: INTERNAL MEDICINE

## 2021-07-26 PROCEDURE — 99999 PR PBB SHADOW E&M-EST. PATIENT-LVL III: ICD-10-PCS | Mod: PBBFAC,,, | Performed by: INTERNAL MEDICINE

## 2021-07-26 PROCEDURE — 3074F PR MOST RECENT SYSTOLIC BLOOD PRESSURE < 130 MM HG: ICD-10-PCS | Mod: CPTII,S$GLB,, | Performed by: INTERNAL MEDICINE

## 2021-07-26 PROCEDURE — 99999 PR PBB SHADOW E&M-EST. PATIENT-LVL III: CPT | Mod: PBBFAC,,, | Performed by: INTERNAL MEDICINE

## 2021-07-26 PROCEDURE — 99214 OFFICE O/P EST MOD 30 MIN: CPT | Mod: S$GLB,,, | Performed by: INTERNAL MEDICINE

## 2021-07-26 PROCEDURE — 3078F DIAST BP <80 MM HG: CPT | Mod: CPTII,S$GLB,, | Performed by: INTERNAL MEDICINE

## 2021-07-26 PROCEDURE — 3008F PR BODY MASS INDEX (BMI) DOCUMENTED: ICD-10-PCS | Mod: CPTII,S$GLB,, | Performed by: INTERNAL MEDICINE

## 2021-07-26 PROCEDURE — 1159F PR MEDICATION LIST DOCUMENTED IN MEDICAL RECORD: ICD-10-PCS | Mod: CPTII,S$GLB,, | Performed by: INTERNAL MEDICINE

## 2021-07-26 PROCEDURE — 1160F RVW MEDS BY RX/DR IN RCRD: CPT | Mod: CPTII,S$GLB,, | Performed by: INTERNAL MEDICINE

## 2021-07-26 PROCEDURE — 3008F BODY MASS INDEX DOCD: CPT | Mod: CPTII,S$GLB,, | Performed by: INTERNAL MEDICINE

## 2021-08-03 ENCOUNTER — PATIENT MESSAGE (OUTPATIENT)
Dept: CARDIOLOGY | Facility: HOSPITAL | Age: 51
End: 2021-08-03

## 2021-08-04 ENCOUNTER — PATIENT MESSAGE (OUTPATIENT)
Dept: CARDIOLOGY | Facility: HOSPITAL | Age: 51
End: 2021-08-04

## 2021-09-07 ENCOUNTER — TELEPHONE (OUTPATIENT)
Dept: CARDIOLOGY | Facility: CLINIC | Age: 51
End: 2021-09-07

## 2021-09-14 ENCOUNTER — PATIENT OUTREACH (OUTPATIENT)
Dept: ADMINISTRATIVE | Facility: HOSPITAL | Age: 51
End: 2021-09-14

## 2021-10-12 ENCOUNTER — PATIENT OUTREACH (OUTPATIENT)
Dept: ADMINISTRATIVE | Facility: HOSPITAL | Age: 51
End: 2021-10-12

## 2021-10-22 DIAGNOSIS — I10 HTN, GOAL BELOW 130/80: ICD-10-CM

## 2021-10-22 RX ORDER — CANDESARTAN CILEXETIL AND HYDROCHLOROTHIAZIDE 16; 12.5 MG/1; MG/1
TABLET ORAL
Qty: 90 TABLET | Refills: 1 | Status: SHIPPED | OUTPATIENT
Start: 2021-10-22 | End: 2022-04-27

## 2021-11-16 ENCOUNTER — HOSPITAL ENCOUNTER (OUTPATIENT)
Dept: CARDIOLOGY | Facility: HOSPITAL | Age: 51
Discharge: HOME | End: 2021-11-16
Attending: INTERNAL MEDICINE
Payer: COMMERCIAL

## 2021-11-16 VITALS
HEIGHT: 70 IN | BODY MASS INDEX: 32.64 KG/M2 | SYSTOLIC BLOOD PRESSURE: 125 MMHG | DIASTOLIC BLOOD PRESSURE: 87 MMHG | WEIGHT: 228 LBS

## 2021-11-16 VITALS
BODY MASS INDEX: 32.64 KG/M2 | WEIGHT: 228 LBS | DIASTOLIC BLOOD PRESSURE: 87 MMHG | SYSTOLIC BLOOD PRESSURE: 125 MMHG | HEIGHT: 70 IN

## 2021-11-16 DIAGNOSIS — G47.33 OSA (OBSTRUCTIVE SLEEP APNEA): ICD-10-CM

## 2021-11-16 DIAGNOSIS — E78.2 MIXED HYPERLIPIDEMIA: ICD-10-CM

## 2021-11-16 DIAGNOSIS — I10 ESSENTIAL HYPERTENSION: ICD-10-CM

## 2021-11-16 DIAGNOSIS — E66.9 OBESITY (BMI 30.0-34.9): ICD-10-CM

## 2021-11-16 DIAGNOSIS — R07.89 OTHER CHEST PAIN: ICD-10-CM

## 2021-11-16 LAB
AORTIC ROOT ANNULUS: 3.72 CM
ASCENDING AORTA: 2.91 CM
AV INDEX (PROSTH): 0.82
AV MEAN GRADIENT: 4 MMHG
AV PEAK GRADIENT: 8 MMHG
AV VALVE AREA: 2.49 CM2
AV VELOCITY RATIO: 0.76
BSA FOR ECHO PROCEDURE: 2.26 M2
CV ECHO LV RWT: 0.36 CM
CV STRESS BASE HR: 88 BPM
DIASTOLIC BLOOD PRESSURE: 87 MMHG
DOP CALC AO PEAK VEL: 1.41 M/S
DOP CALC AO VTI: 25.6 CM
DOP CALC LVOT AREA: 3 CM2
DOP CALC LVOT DIAMETER: 1.97 CM
DOP CALC LVOT PEAK VEL: 1.07 M/S
DOP CALC LVOT STROKE VOLUME: 63.67 CM3
DOP CALC RVOT PEAK VEL: 0.96 M/S
DOP CALC RVOT VTI: 18.4 CM
DOP CALCLVOT PEAK VEL VTI: 20.9 CM
E WAVE DECELERATION TIME: 120.23 MSEC
E/A RATIO: 0.79
E/E' RATIO: 6.76 M/S
ECHO EF ESTIMATED: 54 %
ECHO LV POSTERIOR WALL: 0.86 CM (ref 0.6–1.1)
EJECTION FRACTION: 60 %
FRACTIONAL SHORTENING: 28 % (ref 28–44)
INTERVENTRICULAR SEPTUM: 0.9 CM (ref 0.6–1.1)
IVRT: 85.63 MSEC
LA MAJOR: 4.79 CM
LA MINOR: 4.53 CM
LA WIDTH: 3.02 CM
LEFT ATRIUM SIZE: 3.22 CM
LEFT ATRIUM VOLUME INDEX MOD: 12.5 ML/M2
LEFT ATRIUM VOLUME INDEX: 17.4 ML/M2
LEFT ATRIUM VOLUME MOD: 27.68 CM3
LEFT ATRIUM VOLUME: 38.49 CM3
LEFT INTERNAL DIMENSION IN SYSTOLE: 3.42 CM (ref 2.1–4)
LEFT VENTRICLE DIASTOLIC VOLUME INDEX: 47.38 ML/M2
LEFT VENTRICLE DIASTOLIC VOLUME: 104.7 ML
LEFT VENTRICLE MASS INDEX: 64 G/M2
LEFT VENTRICLE SYSTOLIC VOLUME INDEX: 21.8 ML/M2
LEFT VENTRICLE SYSTOLIC VOLUME: 48.14 ML
LEFT VENTRICULAR INTERNAL DIMENSION IN DIASTOLE: 4.75 CM (ref 3.5–6)
LEFT VENTRICULAR MASS: 140.98 G
LV LATERAL E/E' RATIO: 6.45 M/S
LV SEPTAL E/E' RATIO: 7.1 M/S
LVOT MG: 3.13 MMHG
LVOT MV: 0.86 CM/S
MV PEAK A VEL: 0.9 M/S
MV PEAK E VEL: 0.71 M/S
MV STENOSIS PRESSURE HALF TIME: 34.87 MS
MV VALVE AREA P 1/2 METHOD: 6.31 CM2
OHS CV CPX 1 MINUTE RECOVERY HEART RATE: 125 BPM
OHS CV CPX 85 PERCENT MAX PREDICTED HEART RATE MALE: 144
OHS CV CPX ESTIMATED METS: 11
OHS CV CPX MAX PREDICTED HEART RATE: 169
OHS CV CPX PATIENT IS FEMALE: 0
OHS CV CPX PATIENT IS MALE: 1
OHS CV CPX PEAK DIASTOLIC BLOOD PRESSURE: 76 MMHG
OHS CV CPX PEAK HEAR RATE: 150 BPM
OHS CV CPX PEAK RATE PRESSURE PRODUCT: NORMAL
OHS CV CPX PEAK SYSTOLIC BLOOD PRESSURE: 168 MMHG
OHS CV CPX PERCENT MAX PREDICTED HEART RATE ACHIEVED: 89
OHS CV CPX RATE PRESSURE PRODUCT PRESENTING: NORMAL
PULM VEIN S/D RATIO: 1.53
PV MEAN GRADIENT: 2.65 MMHG
PV PEAK D VEL: 0.34 M/S
PV PEAK S VEL: 0.52 M/S
PV PEAK VELOCITY: 1 CM/S
RA MAJOR: 4.19 CM
RA PRESSURE: 3 MMHG
RA WIDTH: 3.19 CM
RIGHT VENTRICULAR END-DIASTOLIC DIMENSION: 2.44 CM
SINUS: 3.17 CM
STJ: 2.45 CM
STRESS ECHO POST EXERCISE DUR MIN: 7 MINUTES
STRESS ECHO POST EXERCISE DUR SEC: 31 SECONDS
SYSTOLIC BLOOD PRESSURE: 125 MMHG
TDI LATERAL: 0.11 M/S
TDI SEPTAL: 0.1 M/S
TDI: 0.11 M/S
TRICUSPID ANNULAR PLANE SYSTOLIC EXCURSION: 2.3 CM

## 2021-11-16 PROCEDURE — 93306 ECHO (CUPID ONLY): ICD-10-PCS | Mod: 26,,, | Performed by: INTERNAL MEDICINE

## 2021-11-16 PROCEDURE — 93018 EXERCISE STRESS - EKG (CUPID ONLY): ICD-10-PCS | Mod: ,,, | Performed by: INTERNAL MEDICINE

## 2021-11-16 PROCEDURE — 93018 CV STRESS TEST I&R ONLY: CPT | Mod: ,,, | Performed by: INTERNAL MEDICINE

## 2021-11-16 PROCEDURE — 93306 TTE W/DOPPLER COMPLETE: CPT | Mod: PO

## 2021-11-16 PROCEDURE — 93017 CV STRESS TEST TRACING ONLY: CPT | Mod: PO

## 2021-11-16 PROCEDURE — 93306 TTE W/DOPPLER COMPLETE: CPT | Mod: 26,,, | Performed by: INTERNAL MEDICINE

## 2021-11-16 PROCEDURE — 93016 EXERCISE STRESS - EKG (CUPID ONLY): ICD-10-PCS | Mod: ,,, | Performed by: INTERNAL MEDICINE

## 2021-11-16 PROCEDURE — 93016 CV STRESS TEST SUPVJ ONLY: CPT | Mod: ,,, | Performed by: INTERNAL MEDICINE

## 2021-12-10 ENCOUNTER — PATIENT MESSAGE (OUTPATIENT)
Dept: PRIMARY CARE CLINIC | Facility: CLINIC | Age: 51
End: 2021-12-10
Payer: COMMERCIAL

## 2022-01-25 ENCOUNTER — OFFICE VISIT (OUTPATIENT)
Dept: FAMILY MEDICINE | Facility: CLINIC | Age: 52
End: 2022-01-25
Payer: COMMERCIAL

## 2022-01-25 ENCOUNTER — LAB VISIT (OUTPATIENT)
Dept: LAB | Facility: HOSPITAL | Age: 52
End: 2022-01-25
Attending: FAMILY MEDICINE
Payer: COMMERCIAL

## 2022-01-25 VITALS
HEIGHT: 70 IN | OXYGEN SATURATION: 97 % | BODY MASS INDEX: 32.57 KG/M2 | DIASTOLIC BLOOD PRESSURE: 72 MMHG | WEIGHT: 227.5 LBS | SYSTOLIC BLOOD PRESSURE: 106 MMHG | TEMPERATURE: 99 F | HEART RATE: 91 BPM

## 2022-01-25 DIAGNOSIS — Z80.42 FAMILY HISTORY OF PROSTATE CANCER: ICD-10-CM

## 2022-01-25 DIAGNOSIS — I10 ESSENTIAL HYPERTENSION: ICD-10-CM

## 2022-01-25 DIAGNOSIS — Z80.42 FAMILY HISTORY OF PROSTATE CANCER: Primary | ICD-10-CM

## 2022-01-25 DIAGNOSIS — Z12.11 COLON CANCER SCREENING: ICD-10-CM

## 2022-01-25 DIAGNOSIS — Z12.5 SCREENING FOR PROSTATE CANCER: ICD-10-CM

## 2022-01-25 DIAGNOSIS — G47.00 INSOMNIA, CONTROLLED: ICD-10-CM

## 2022-01-25 DIAGNOSIS — F51.01 PRIMARY INSOMNIA: ICD-10-CM

## 2022-01-25 PROCEDURE — 99214 OFFICE O/P EST MOD 30 MIN: CPT | Mod: S$GLB,,, | Performed by: FAMILY MEDICINE

## 2022-01-25 PROCEDURE — 3078F DIAST BP <80 MM HG: CPT | Mod: CPTII,S$GLB,, | Performed by: FAMILY MEDICINE

## 2022-01-25 PROCEDURE — 1159F PR MEDICATION LIST DOCUMENTED IN MEDICAL RECORD: ICD-10-PCS | Mod: CPTII,S$GLB,, | Performed by: FAMILY MEDICINE

## 2022-01-25 PROCEDURE — 99999 PR PBB SHADOW E&M-EST. PATIENT-LVL III: ICD-10-PCS | Mod: PBBFAC,,, | Performed by: FAMILY MEDICINE

## 2022-01-25 PROCEDURE — 3008F BODY MASS INDEX DOCD: CPT | Mod: CPTII,S$GLB,, | Performed by: FAMILY MEDICINE

## 2022-01-25 PROCEDURE — 3008F PR BODY MASS INDEX (BMI) DOCUMENTED: ICD-10-PCS | Mod: CPTII,S$GLB,, | Performed by: FAMILY MEDICINE

## 2022-01-25 PROCEDURE — 1160F RVW MEDS BY RX/DR IN RCRD: CPT | Mod: CPTII,S$GLB,, | Performed by: FAMILY MEDICINE

## 2022-01-25 PROCEDURE — 99999 PR PBB SHADOW E&M-EST. PATIENT-LVL III: CPT | Mod: PBBFAC,,, | Performed by: FAMILY MEDICINE

## 2022-01-25 PROCEDURE — 1159F MED LIST DOCD IN RCRD: CPT | Mod: CPTII,S$GLB,, | Performed by: FAMILY MEDICINE

## 2022-01-25 PROCEDURE — 3074F SYST BP LT 130 MM HG: CPT | Mod: CPTII,S$GLB,, | Performed by: FAMILY MEDICINE

## 2022-01-25 PROCEDURE — 1160F PR REVIEW ALL MEDS BY PRESCRIBER/CLIN PHARMACIST DOCUMENTED: ICD-10-PCS | Mod: CPTII,S$GLB,, | Performed by: FAMILY MEDICINE

## 2022-01-25 PROCEDURE — 3074F PR MOST RECENT SYSTOLIC BLOOD PRESSURE < 130 MM HG: ICD-10-PCS | Mod: CPTII,S$GLB,, | Performed by: FAMILY MEDICINE

## 2022-01-25 PROCEDURE — 36415 COLL VENOUS BLD VENIPUNCTURE: CPT | Mod: PO | Performed by: FAMILY MEDICINE

## 2022-01-25 PROCEDURE — 84153 ASSAY OF PSA TOTAL: CPT | Performed by: FAMILY MEDICINE

## 2022-01-25 PROCEDURE — 3078F PR MOST RECENT DIASTOLIC BLOOD PRESSURE < 80 MM HG: ICD-10-PCS | Mod: CPTII,S$GLB,, | Performed by: FAMILY MEDICINE

## 2022-01-25 PROCEDURE — 80048 BASIC METABOLIC PNL TOTAL CA: CPT | Performed by: FAMILY MEDICINE

## 2022-01-25 PROCEDURE — 99214 PR OFFICE/OUTPT VISIT, EST, LEVL IV, 30-39 MIN: ICD-10-PCS | Mod: S$GLB,,, | Performed by: FAMILY MEDICINE

## 2022-01-25 RX ORDER — ZOLPIDEM TARTRATE 10 MG/1
10 TABLET ORAL NIGHTLY PRN
Qty: 90 TABLET | Refills: 1 | Status: SHIPPED | OUTPATIENT
Start: 2022-01-25 | End: 2022-04-27 | Stop reason: SDUPTHER

## 2022-01-25 NOTE — PROGRESS NOTES
Chief Complaint:    Chief Complaint   Patient presents with    Medication Refill    Insomnia       History of Present Illness:    Patient presents for follow-up of chronic medical problems,  He has chronic insomnia takes Ambien is tried other medications like trazodone Lunesta and could not tolerate.  He wanted to look into other options  Blood pressure stable on current meds  Family history of prostate cancer in brother wants to get checked for      ROS:  Review of Systems   Constitutional: Negative for activity change, chills, fatigue, fever and unexpected weight change.   HENT: Negative for congestion, ear discharge, ear pain, hearing loss, postnasal drip and rhinorrhea.    Eyes: Negative for pain and visual disturbance.   Respiratory: Negative for cough, chest tightness and shortness of breath.    Cardiovascular: Negative for chest pain and palpitations.   Gastrointestinal: Negative for abdominal pain, diarrhea and vomiting.   Endocrine: Negative for heat intolerance.   Genitourinary: Negative for dysuria, flank pain, frequency and hematuria.   Musculoskeletal: Negative for back pain, gait problem and neck pain.   Skin: Negative for color change and rash.   Neurological: Negative for dizziness, tremors, seizures, numbness and headaches.   Psychiatric/Behavioral: Negative for agitation, hallucinations, self-injury, sleep disturbance and suicidal ideas. The patient is not nervous/anxious.        Past Medical History:   Diagnosis Date    Hyperlipidemia     Hypertension     Insomnia        Social History:  Social History     Socioeconomic History    Marital status: Single   Tobacco Use    Smoking status: Former Smoker     Quit date: 3/26/2018     Years since quitting: 3.8    Smokeless tobacco: Never Used   Substance and Sexual Activity    Alcohol use: No    Drug use: No    Sexual activity: Yes     Partners: Female       Family History:   family history includes Colon cancer in his maternal grandfather  "and paternal grandfather; Heart disease in his mother; Prostate cancer in his brother.    Health Maintenance   Topic Date Due    Lipid Panel  03/22/2026    TETANUS VACCINE  01/09/2028    Hepatitis C Screening  Completed       Physical Exam:    Vital Signs  Temp: 99 °F (37.2 °C)  Temp src: Temporal  Pulse: 91  SpO2: 97 %  BP: 106/72  Pain Score: 0-No pain  Height and Weight  Height: 5' 10" (177.8 cm)  Weight: 103.2 kg (227 lb 8.2 oz)  BSA (Calculated - sq m): 2.26 sq meters  BMI (Calculated): 32.6  Weight in (lb) to have BMI = 25: 173.9]    Body mass index is 32.65 kg/m².    Physical Exam  Constitutional:       Appearance: He is well-developed.   HENT:      Mouth/Throat:      Mouth: Oropharynx is clear and moist.   Eyes:      Conjunctiva/sclera: Conjunctivae normal.      Pupils: Pupils are equal, round, and reactive to light.   Cardiovascular:      Rate and Rhythm: Normal rate and regular rhythm.      Heart sounds: Normal heart sounds.   Pulmonary:      Breath sounds: Normal breath sounds.   Musculoskeletal:         General: Normal range of motion.      Cervical back: Normal range of motion.   Neurological:      Mental Status: He is alert.      Coordination: Coordination normal.   Psychiatric:         Mood and Affect: Mood and affect normal.         Behavior: Behavior normal.         Thought Content: Thought content normal.         Judgment: Judgment normal.           Assessment:      ICD-10-CM ICD-9-CM   1. Family history of prostate cancer  Z80.42 V16.42   2. Primary insomnia  F51.01 307.42   3. Essential hypertension  I10 401.9   4. Screening for prostate cancer  Z12.5 V76.44   5. Colon cancer screening  Z12.11 V76.51   6. Insomnia, controlled  G47.00 780.52         Plan:       Discuss sleep hygiene, cognitive behavioral therapy and meditation techniques for chronic insomnia  Will check PSA today  Do recommend Cologuard testing  Hypertension stable  Follow-up 6 months        Orders Placed This Encounter "   Procedures    Cologuard Screening (Multitarget Stool DNA)    PSA, Screening    Basic Metabolic Panel       Current Outpatient Medications   Medication Sig Dispense Refill    aspirin (ECOTRIN) 81 MG EC tablet Take 81 mg by mouth.      candesartan-hydrochlorothiazide (ATACAND HCT) 16-12.5 mg per tablet TAKE 1 TABLET BY MOUTH EVERY DAY 90 tablet 1    omega 3-dha-epa-fish oil 900-1,400 mg CpDR Take by mouth once daily.       zolpidem (AMBIEN) 10 mg Tab Take 1 tablet (10 mg total) by mouth nightly as needed. 90 tablet 1     No current facility-administered medications for this visit.       Medications Discontinued During This Encounter   Medication Reason    zolpidem (AMBIEN) 10 mg Tab Reorder       Follow up in about 6 months (around 7/25/2022).      Lora Capps MD

## 2022-01-26 LAB
ANION GAP SERPL CALC-SCNC: 9 MMOL/L (ref 8–16)
BUN SERPL-MCNC: 11 MG/DL (ref 6–20)
CALCIUM SERPL-MCNC: 9.7 MG/DL (ref 8.7–10.5)
CHLORIDE SERPL-SCNC: 103 MMOL/L (ref 95–110)
CO2 SERPL-SCNC: 24 MMOL/L (ref 23–29)
COMPLEXED PSA SERPL-MCNC: 0.8 NG/ML (ref 0–4)
CREAT SERPL-MCNC: 0.8 MG/DL (ref 0.5–1.4)
EST. GFR  (AFRICAN AMERICAN): >60 ML/MIN/1.73 M^2
EST. GFR  (NON AFRICAN AMERICAN): >60 ML/MIN/1.73 M^2
GLUCOSE SERPL-MCNC: 100 MG/DL (ref 70–110)
POTASSIUM SERPL-SCNC: 4.3 MMOL/L (ref 3.5–5.1)
SODIUM SERPL-SCNC: 136 MMOL/L (ref 136–145)

## 2022-02-09 LAB — NONINV COLON CA DNA+OCC BLD SCRN STL QL: NEGATIVE

## 2022-04-27 DIAGNOSIS — G47.00 INSOMNIA, CONTROLLED: ICD-10-CM

## 2022-04-27 DIAGNOSIS — I10 HTN, GOAL BELOW 130/80: ICD-10-CM

## 2022-04-27 RX ORDER — CANDESARTAN CILEXETIL AND HYDROCHLOROTHIAZIDE 16; 12.5 MG/1; MG/1
TABLET ORAL
Qty: 90 TABLET | Refills: 2 | Status: SHIPPED | OUTPATIENT
Start: 2022-04-27 | End: 2023-01-25

## 2022-04-27 NOTE — TELEPHONE ENCOUNTER
No new care gaps identified.  Powered by [employer]. Reference number: [id number].   4/27/2022 12:10:10 AM CDT

## 2022-04-27 NOTE — TELEPHONE ENCOUNTER
Refill Authorization Note   Nehemias Zhang  is requesting a refill authorization.  Brief Assessment and Rationale for Refill:  Approve     Medication Therapy Plan:       Medication Reconciliation Completed: No   Comments:     No Care Gaps recommended.     Note composed:11:34 AM 04/27/2022

## 2022-04-27 NOTE — TELEPHONE ENCOUNTER
No new care gaps identified.  Powered by [employer]. Reference number: [id number].   4/27/2022 6:00:39 PM CDT

## 2022-04-28 RX ORDER — ZOLPIDEM TARTRATE 10 MG/1
10 TABLET ORAL NIGHTLY PRN
Qty: 90 TABLET | Refills: 1 | Status: SHIPPED | OUTPATIENT
Start: 2022-04-28 | End: 2022-10-04 | Stop reason: SDUPTHER

## 2022-10-04 ENCOUNTER — OFFICE VISIT (OUTPATIENT)
Dept: FAMILY MEDICINE | Facility: CLINIC | Age: 52
End: 2022-10-04
Payer: COMMERCIAL

## 2022-10-04 ENCOUNTER — LAB VISIT (OUTPATIENT)
Dept: LAB | Facility: HOSPITAL | Age: 52
End: 2022-10-04
Attending: FAMILY MEDICINE
Payer: COMMERCIAL

## 2022-10-04 VITALS
WEIGHT: 222.75 LBS | HEART RATE: 84 BPM | SYSTOLIC BLOOD PRESSURE: 110 MMHG | TEMPERATURE: 98 F | OXYGEN SATURATION: 97 % | BODY MASS INDEX: 31.19 KG/M2 | DIASTOLIC BLOOD PRESSURE: 60 MMHG | HEIGHT: 71 IN

## 2022-10-04 DIAGNOSIS — I10 ESSENTIAL HYPERTENSION: ICD-10-CM

## 2022-10-04 DIAGNOSIS — G47.00 INSOMNIA, CONTROLLED: ICD-10-CM

## 2022-10-04 DIAGNOSIS — J30.9 ALLERGIC RHINITIS, UNSPECIFIED SEASONALITY, UNSPECIFIED TRIGGER: ICD-10-CM

## 2022-10-04 DIAGNOSIS — E78.2 MIXED HYPERLIPIDEMIA: ICD-10-CM

## 2022-10-04 DIAGNOSIS — I10 ESSENTIAL HYPERTENSION: Primary | ICD-10-CM

## 2022-10-04 DIAGNOSIS — G47.33 OSA ON CPAP: ICD-10-CM

## 2022-10-04 LAB
ALBUMIN SERPL BCP-MCNC: 4.2 G/DL (ref 3.5–5.2)
ALP SERPL-CCNC: 53 U/L (ref 55–135)
ALT SERPL W/O P-5'-P-CCNC: 28 U/L (ref 10–44)
ANION GAP SERPL CALC-SCNC: 10 MMOL/L (ref 8–16)
AST SERPL-CCNC: 18 U/L (ref 10–40)
BILIRUB SERPL-MCNC: 0.3 MG/DL (ref 0.1–1)
BUN SERPL-MCNC: 12 MG/DL (ref 6–20)
CALCIUM SERPL-MCNC: 9.7 MG/DL (ref 8.7–10.5)
CHLORIDE SERPL-SCNC: 101 MMOL/L (ref 95–110)
CHOLEST SERPL-MCNC: 197 MG/DL (ref 120–199)
CHOLEST/HDLC SERPL: 5.6 {RATIO} (ref 2–5)
CO2 SERPL-SCNC: 24 MMOL/L (ref 23–29)
CREAT SERPL-MCNC: 0.8 MG/DL (ref 0.5–1.4)
EST. GFR  (NO RACE VARIABLE): >60 ML/MIN/1.73 M^2
GLUCOSE SERPL-MCNC: 97 MG/DL (ref 70–110)
HDLC SERPL-MCNC: 35 MG/DL (ref 40–75)
HDLC SERPL: 17.8 % (ref 20–50)
LDLC SERPL CALC-MCNC: 92 MG/DL (ref 63–159)
NONHDLC SERPL-MCNC: 162 MG/DL
POTASSIUM SERPL-SCNC: 4.2 MMOL/L (ref 3.5–5.1)
PROT SERPL-MCNC: 7.2 G/DL (ref 6–8.4)
SODIUM SERPL-SCNC: 135 MMOL/L (ref 136–145)
TRIGL SERPL-MCNC: 350 MG/DL (ref 30–150)

## 2022-10-04 PROCEDURE — 90686 FLU VACCINE (QUAD) GREATER THAN OR EQUAL TO 3YO PRESERVATIVE FREE IM: ICD-10-PCS | Mod: S$GLB,,, | Performed by: FAMILY MEDICINE

## 2022-10-04 PROCEDURE — 90471 FLU VACCINE (QUAD) GREATER THAN OR EQUAL TO 3YO PRESERVATIVE FREE IM: ICD-10-PCS | Mod: S$GLB,,, | Performed by: FAMILY MEDICINE

## 2022-10-04 PROCEDURE — 90750 HZV VACC RECOMBINANT IM: CPT | Mod: S$GLB,,, | Performed by: FAMILY MEDICINE

## 2022-10-04 PROCEDURE — 90472 IMMUNIZATION ADMIN EACH ADD: CPT | Mod: S$GLB,,, | Performed by: FAMILY MEDICINE

## 2022-10-04 PROCEDURE — 1159F MED LIST DOCD IN RCRD: CPT | Mod: CPTII,S$GLB,, | Performed by: FAMILY MEDICINE

## 2022-10-04 PROCEDURE — 99214 PR OFFICE/OUTPT VISIT, EST, LEVL IV, 30-39 MIN: ICD-10-PCS | Mod: 25,S$GLB,, | Performed by: FAMILY MEDICINE

## 2022-10-04 PROCEDURE — 80053 COMPREHEN METABOLIC PANEL: CPT | Performed by: FAMILY MEDICINE

## 2022-10-04 PROCEDURE — 90472 ZOSTER RECOMBINANT VACCINE: ICD-10-PCS | Mod: S$GLB,,, | Performed by: FAMILY MEDICINE

## 2022-10-04 PROCEDURE — 90750 ZOSTER RECOMBINANT VACCINE: ICD-10-PCS | Mod: S$GLB,,, | Performed by: FAMILY MEDICINE

## 2022-10-04 PROCEDURE — 3008F PR BODY MASS INDEX (BMI) DOCUMENTED: ICD-10-PCS | Mod: CPTII,S$GLB,, | Performed by: FAMILY MEDICINE

## 2022-10-04 PROCEDURE — 3078F DIAST BP <80 MM HG: CPT | Mod: CPTII,S$GLB,, | Performed by: FAMILY MEDICINE

## 2022-10-04 PROCEDURE — 36415 COLL VENOUS BLD VENIPUNCTURE: CPT | Mod: PO | Performed by: FAMILY MEDICINE

## 2022-10-04 PROCEDURE — 3078F PR MOST RECENT DIASTOLIC BLOOD PRESSURE < 80 MM HG: ICD-10-PCS | Mod: CPTII,S$GLB,, | Performed by: FAMILY MEDICINE

## 2022-10-04 PROCEDURE — 90471 IMMUNIZATION ADMIN: CPT | Mod: S$GLB,,, | Performed by: FAMILY MEDICINE

## 2022-10-04 PROCEDURE — 3074F PR MOST RECENT SYSTOLIC BLOOD PRESSURE < 130 MM HG: ICD-10-PCS | Mod: CPTII,S$GLB,, | Performed by: FAMILY MEDICINE

## 2022-10-04 PROCEDURE — 3008F BODY MASS INDEX DOCD: CPT | Mod: CPTII,S$GLB,, | Performed by: FAMILY MEDICINE

## 2022-10-04 PROCEDURE — 90686 IIV4 VACC NO PRSV 0.5 ML IM: CPT | Mod: S$GLB,,, | Performed by: FAMILY MEDICINE

## 2022-10-04 PROCEDURE — 99999 PR PBB SHADOW E&M-EST. PATIENT-LVL III: CPT | Mod: PBBFAC,,, | Performed by: FAMILY MEDICINE

## 2022-10-04 PROCEDURE — 80061 LIPID PANEL: CPT | Performed by: FAMILY MEDICINE

## 2022-10-04 PROCEDURE — 1159F PR MEDICATION LIST DOCUMENTED IN MEDICAL RECORD: ICD-10-PCS | Mod: CPTII,S$GLB,, | Performed by: FAMILY MEDICINE

## 2022-10-04 PROCEDURE — 99214 OFFICE O/P EST MOD 30 MIN: CPT | Mod: 25,S$GLB,, | Performed by: FAMILY MEDICINE

## 2022-10-04 PROCEDURE — 99999 PR PBB SHADOW E&M-EST. PATIENT-LVL III: ICD-10-PCS | Mod: PBBFAC,,, | Performed by: FAMILY MEDICINE

## 2022-10-04 PROCEDURE — 3074F SYST BP LT 130 MM HG: CPT | Mod: CPTII,S$GLB,, | Performed by: FAMILY MEDICINE

## 2022-10-04 RX ORDER — FLUTICASONE PROPIONATE 50 MCG
2 SPRAY, SUSPENSION (ML) NASAL DAILY
Qty: 16 G | Refills: 11 | Status: SHIPPED | OUTPATIENT
Start: 2022-10-04

## 2022-10-04 RX ORDER — ZOLPIDEM TARTRATE 10 MG/1
10 TABLET ORAL NIGHTLY PRN
Qty: 90 TABLET | Refills: 1 | Status: SHIPPED | OUTPATIENT
Start: 2022-10-04 | End: 2022-11-03

## 2022-10-04 NOTE — PROGRESS NOTES
Chief Complaint:    Chief Complaint   Patient presents with    Follow-up       History of Present Illness:  Patient with HTN, HLD presents today for a six month follow-up    BP good today. Monitoring it at home.   He's started bike riding.   Returned to smoking for a bit after his mom passed.   Using CPAP. He's had it for three years and wants to  know when it needs to be replaced. It wasn't involved in the recall.   Will do labs today. PSA was done in January - normal.   Drinking 80 oz of water per day.   Due for shingles vaccination.   Received his flu shot.   He has chronic insomnia takes Ambien is tried other medications like trazodone Lunesta and could not tolerate.  FMHx of colon cancer  Denies CP or SOB  Dealing with dust allergy, takes Zyrtec sometimes.     ROS:  Review of Systems   Constitutional:  Negative for activity change, appetite change, chills, fatigue, fever and unexpected weight change.   HENT:  Negative for congestion, ear discharge, ear pain, hearing loss, postnasal drip, rhinorrhea, sinus pressure and sinus pain.    Eyes:  Negative for pain and visual disturbance.   Respiratory:  Negative for cough, chest tightness and shortness of breath.    Cardiovascular:  Negative for chest pain and palpitations.   Gastrointestinal:  Negative for abdominal pain, blood in stool, constipation, diarrhea, nausea and vomiting.   Endocrine: Negative for heat intolerance.   Genitourinary:  Negative for difficulty urinating, dysuria, flank pain, frequency and hematuria.   Musculoskeletal:  Negative for arthralgias, back pain, gait problem, myalgias and neck pain.   Skin:  Negative for color change, pallor, rash and wound.   Allergic/Immunologic: Positive for environmental allergies.   Neurological:  Negative for dizziness, tremors, seizures, speech difficulty, light-headedness, numbness and headaches.   Psychiatric/Behavioral:  Negative for agitation, behavioral problems, dysphoric mood, hallucinations,  "self-injury, sleep disturbance and suicidal ideas. The patient is not nervous/anxious.    All other systems reviewed and are negative.    Past Medical History:   Diagnosis Date    Hyperlipidemia     Hypertension     Insomnia        Social History:  Social History     Socioeconomic History    Marital status: Single   Tobacco Use    Smoking status: Former     Types: Cigarettes     Quit date: 3/26/2018     Years since quittin.5    Smokeless tobacco: Never   Substance and Sexual Activity    Alcohol use: No    Drug use: No    Sexual activity: Yes     Partners: Female       Family History:   family history includes Colon cancer in his maternal grandfather and paternal grandfather; Heart disease in his mother; Prostate cancer in his brother.    Health Maintenance   Topic Date Due    Lipid Panel  2026    TETANUS VACCINE  2028    Hepatitis C Screening  Completed       Physical Exam:    Vital Signs  Temp: 97.7 °F (36.5 °C)  Temp src: Tympanic  Pulse: 84  SpO2: 97 %  BP: 110/60  BP Location: Left arm  Patient Position: Sitting  Pain Score: 0-No pain  Height and Weight  Height: 5' 11" (180.3 cm)  Weight: 101.1 kg (222 lb 12.4 oz)  BSA (Calculated - sq m): 2.25 sq meters  BMI (Calculated): 31.1  Weight in (lb) to have BMI = 25: 178.9]    Body mass index is 31.07 kg/m².    Physical Exam  Vitals and nursing note reviewed.   Constitutional:       Appearance: Normal appearance.   HENT:      Head: Normocephalic and atraumatic.      Right Ear: Tympanic membrane normal.      Left Ear: Tympanic membrane normal.   Eyes:      Extraocular Movements: Extraocular movements intact.      Pupils: Pupils are equal, round, and reactive to light.   Cardiovascular:      Rate and Rhythm: Normal rate and regular rhythm.      Pulses: Normal pulses.      Heart sounds: Normal heart sounds. No murmur heard.    No gallop.   Pulmonary:      Effort: Pulmonary effort is normal. No respiratory distress.      Breath sounds: Normal breath " sounds. No wheezing, rhonchi or rales.   Abdominal:      General: There is no distension.      Palpations: Abdomen is soft.      Tenderness: There is no abdominal tenderness.   Musculoskeletal:         General: No swelling, deformity or signs of injury. Normal range of motion.      Cervical back: Normal range of motion.   Skin:     General: Skin is warm and dry.      Capillary Refill: Capillary refill takes less than 2 seconds.      Coloration: Skin is not jaundiced or pale.   Neurological:      General: No focal deficit present.      Mental Status: He is alert and oriented to person, place, and time.   Psychiatric:         Mood and Affect: Mood normal.         Behavior: Behavior normal.         Assessment:      ICD-10-CM ICD-9-CM   1. Essential hypertension  I10 401.9   2. Mixed hyperlipidemia  E78.2 272.2   3. ELI on CPAP  G47.33 327.23    Z99.89 V46.8   4. Insomnia, controlled  G47.00 780.52   5. Allergic rhinitis, unspecified seasonality, unspecified trigger  J30.9 477.9     Plan:     Get Influenza - Quadrivalent (Adjuvanted) vaccine today.   Get zoster recombinant vaccine today.  Hypertension stable.   Continue exercise.   Continue CPAP.   check Ambien  Prescription sent to pharmacy  Recommend Flonase 50 mcg/actuation for allergic rhinitis.   See labs below.   Follow up 6 months.   Orders Placed This Encounter   Procedures    Influenza - Quadrivalent (PF)    (In Office Administered) Zoster Recombinant Vaccine    Comprehensive Metabolic Panel    Lipid Panel     Current Outpatient Medications   Medication Sig Dispense Refill    aspirin (ECOTRIN) 81 MG EC tablet Take 81 mg by mouth.      candesartan-hydrochlorothiazide (ATACAND HCT) 16-12.5 mg per tablet TAKE 1 TABLET BY MOUTH EVERY DAY 90 tablet 2    omega 3-dha-epa-fish oil 900-1,400 mg CpDR Take by mouth once daily.       fluticasone propionate (FLONASE) 50 mcg/actuation nasal spray 2 sprays (100 mcg total) by Each Nostril route once daily. 16 g 11     zolpidem (AMBIEN) 10 mg Tab Take 1 tablet (10 mg total) by mouth nightly as needed. 90 tablet 1     No current facility-administered medications for this visit.       Medications Discontinued During This Encounter   Medication Reason    zolpidem (AMBIEN) 10 mg Tab Reorder         Follow up in about 6 months (around 4/4/2023).      Lora Capps MD  Scribe Attestation:   I, Sissy Marlow, am scribing for, and in the presence of, Dr.Arif Capps I performed the above scribed service and the documentation accurately describes the services I performed. I attest to the accuracy of the note.    I, Dr. Lora Capps, reviewed documentation as scribed above. I performed the services described in this documentation.  I agree that the record reflects my personal performance and is accurate and complete. Lora Capps MD.  10/04/2022

## 2022-10-04 NOTE — PROGRESS NOTES
Shingrix administered in right deltoid. Tolerated well and recommended waiting 15 minutes to watch for adverse

## 2023-01-25 DIAGNOSIS — I10 HTN, GOAL BELOW 130/80: ICD-10-CM

## 2023-01-25 RX ORDER — CANDESARTAN CILEXETIL AND HYDROCHLOROTHIAZIDE 16; 12.5 MG/1; MG/1
TABLET ORAL
Qty: 90 TABLET | Refills: 2 | Status: SHIPPED | OUTPATIENT
Start: 2023-01-25 | End: 2023-11-06 | Stop reason: SDUPTHER

## 2023-01-25 NOTE — TELEPHONE ENCOUNTER
No new care gaps identified.  [facility] Embedded Care Gaps. Reference number: [id number]. 1/25/2023   12:11:47 AM CST

## 2023-01-25 NOTE — TELEPHONE ENCOUNTER
Refill Decision Note   Nehemias Zhang  is requesting a refill authorization.  Brief Assessment and Rationale for Refill:  Approve     Medication Therapy Plan:       Medication Reconciliation Completed: No   Comments:     No Care Gaps recommended.     Note composed:4:02 AM 01/25/2023

## 2023-11-04 DIAGNOSIS — I10 HTN, GOAL BELOW 130/80: ICD-10-CM

## 2023-11-04 NOTE — TELEPHONE ENCOUNTER
Care Due:                  Date            Visit Type   Department     Provider  --------------------------------------------------------------------------------                                EP -                              PRIMARY      [facility]  Last Visit: 10-      CARE ([facility])   MEDICINE       Lora Capps  Next Visit: None Scheduled  None         None Found                                                            Last  Test          Frequency    Reason                     Performed    Due Date  --------------------------------------------------------------------------------    Office Visit  15 months..  candesartan-hydrochloroth  10-   12-                             iazide...................    CMP.........  12 months..  candesartan-hydrochloroth  10-   09-                             iazide...................    [employer] Embedded Care Due Messages. Reference number: [id number].   11/04/2023 8:10:18 AM CDT

## 2023-11-04 NOTE — TELEPHONE ENCOUNTER
Refill Routing Note   Medication(s) are not appropriate for processing by [facility] for the following reason(s):      Required labs abnormal  Required vitals abnormal    [facility] action(s):  Defer Care Due:  Appointment due  Labs due            Appointments  past 12m or future 3m with PCP    Date Provider   Last Visit   10/4/2022 Lora Capps MD   Next Visit   Visit date not found Lora Capps MD   ED visits in past 90 days: 0        Note composed:6:15 PM 11/04/2023

## 2023-11-05 RX ORDER — CANDESARTAN CILEXETIL AND HYDROCHLOROTHIAZIDE 16; 12.5 MG/1; MG/1
TABLET ORAL
Qty: 90 TABLET | Refills: 2 | OUTPATIENT
Start: 2023-11-05

## 2023-11-06 DIAGNOSIS — I10 HTN, GOAL BELOW 130/80: ICD-10-CM

## 2023-11-06 RX ORDER — CANDESARTAN CILEXETIL AND HYDROCHLOROTHIAZIDE 16; 12.5 MG/1; MG/1
1 TABLET ORAL DAILY
Qty: 20 TABLET | Refills: 0 | Status: SHIPPED | OUTPATIENT
Start: 2023-11-06

## 2024-02-14 ENCOUNTER — PATIENT MESSAGE (OUTPATIENT)
Dept: FAMILY MEDICINE | Facility: CLINIC | Age: 54
End: 2024-02-14
Payer: COMMERCIAL

## 2024-02-14 ENCOUNTER — TELEPHONE (OUTPATIENT)
Dept: FAMILY MEDICINE | Facility: CLINIC | Age: 54
End: 2024-02-14
Payer: COMMERCIAL

## 2024-02-14 DIAGNOSIS — I10 HTN, GOAL BELOW 130/80: ICD-10-CM

## 2024-02-14 DIAGNOSIS — G47.00 INSOMNIA, CONTROLLED: ICD-10-CM

## 2024-02-14 DIAGNOSIS — Z80.42 FAMILY HISTORY OF PROSTATE CANCER: ICD-10-CM

## 2024-02-14 DIAGNOSIS — E66.9 OBESITY (BMI 30.0-34.9): ICD-10-CM

## 2024-02-14 DIAGNOSIS — I10 ESSENTIAL HYPERTENSION: Primary | ICD-10-CM

## 2024-02-14 DIAGNOSIS — G47.33 OSA ON CPAP: ICD-10-CM

## 2024-02-14 DIAGNOSIS — E78.2 MIXED HYPERLIPIDEMIA: ICD-10-CM

## 2024-02-14 RX ORDER — CANDESARTAN CILEXETIL AND HYDROCHLOROTHIAZIDE 16; 12.5 MG/1; MG/1
1 TABLET ORAL DAILY
Qty: 20 TABLET | Refills: 0 | OUTPATIENT
Start: 2024-02-14

## 2024-02-14 RX ORDER — ZOLPIDEM TARTRATE 10 MG/1
10 TABLET ORAL NIGHTLY PRN
Qty: 90 TABLET | Refills: 1 | OUTPATIENT
Start: 2024-02-14 | End: 2024-03-15

## 2024-02-14 NOTE — TELEPHONE ENCOUNTER
Care Due:                  Date            Visit Type   Department     Provider  --------------------------------------------------------------------------------                                [place] -                              [place]      [facility] [place] [facility]  Last Visit: 10-      CARE ([facility])   MEDICINE       Lora Capps  Next Visit: None Scheduled  None         None Found                                                            Last  Test          Frequency    Reason                     Performed    Due Date  --------------------------------------------------------------------------------    Office Visit  15 months..  candesartan-hydrochloroth  10-   12-                             iazide...................    CMP.........  12 months..  candesartan-hydrochloroth  10-   09-                             iazide...................    [employer] Embedded Care Due Messages. Reference number: [id number].   2/14/2024 12:25:07 PM CST

## 2024-03-29 DIAGNOSIS — I10 HTN, GOAL BELOW 130/80: ICD-10-CM

## 2024-03-29 NOTE — TELEPHONE ENCOUNTER
No care due was identified.  [facility] Embedded Care Due Messages. Reference number: [id number].   3/29/2024 8:35:44 AM CDT

## 2024-04-01 RX ORDER — CANDESARTAN CILEXETIL AND HYDROCHLOROTHIAZIDE 16; 12.5 MG/1; MG/1
1 TABLET ORAL
Qty: 20 TABLET | Refills: 0 | OUTPATIENT
Start: 2024-04-01

## 2024-04-03 ENCOUNTER — PATIENT MESSAGE (OUTPATIENT)
Dept: PULMONOLOGY | Facility: CLINIC | Age: 54
End: 2024-04-03
Payer: COMMERCIAL